# Patient Record
Sex: FEMALE | Race: WHITE
[De-identification: names, ages, dates, MRNs, and addresses within clinical notes are randomized per-mention and may not be internally consistent; named-entity substitution may affect disease eponyms.]

---

## 2018-04-27 ENCOUNTER — APPOINTMENT (OUTPATIENT)
Dept: INTERNAL MEDICINE | Facility: CLINIC | Age: 58
End: 2018-04-27
Payer: COMMERCIAL

## 2018-04-27 VITALS
HEART RATE: 74 BPM | BODY MASS INDEX: 17.58 KG/M2 | WEIGHT: 116 LBS | RESPIRATION RATE: 16 BRPM | HEIGHT: 68 IN | SYSTOLIC BLOOD PRESSURE: 124 MMHG | OXYGEN SATURATION: 98 % | TEMPERATURE: 98 F | DIASTOLIC BLOOD PRESSURE: 80 MMHG

## 2018-04-27 DIAGNOSIS — J30.2 OTHER SEASONAL ALLERGIC RHINITIS: ICD-10-CM

## 2018-04-27 DIAGNOSIS — C44.310 BASAL CELL CARCINOMA OF SKIN OF UNSPECIFIED PARTS OF FACE: ICD-10-CM

## 2018-04-27 DIAGNOSIS — N80.9 ENDOMETRIOSIS, UNSPECIFIED: ICD-10-CM

## 2018-04-27 DIAGNOSIS — R16.1 SPLENOMEGALY, NOT ELSEWHERE CLASSIFIED: ICD-10-CM

## 2018-04-27 DIAGNOSIS — K59.09 OTHER CONSTIPATION: ICD-10-CM

## 2018-04-27 DIAGNOSIS — F45.8 OTHER SOMATOFORM DISORDERS: ICD-10-CM

## 2018-04-27 DIAGNOSIS — I83.90 ASYMPTOMATIC VARICOSE VEINS OF UNSPECIFIED LOWER EXTREMITY: ICD-10-CM

## 2018-04-27 DIAGNOSIS — R91.1 SOLITARY PULMONARY NODULE: ICD-10-CM

## 2018-04-27 DIAGNOSIS — Z88.9 ALLERGY STATUS TO UNSPECIFIED DRUGS, MEDICAMENTS AND BIOLOGICAL SUBSTANCES: ICD-10-CM

## 2018-04-27 DIAGNOSIS — K63.5 POLYP OF COLON: ICD-10-CM

## 2018-04-27 DIAGNOSIS — Z20.1 CONTACT WITH AND (SUSPECTED) EXPOSURE TO TUBERCULOSIS: ICD-10-CM

## 2018-04-27 DIAGNOSIS — E04.1 NONTOXIC SINGLE THYROID NODULE: ICD-10-CM

## 2018-04-27 PROCEDURE — 94729 DIFFUSING CAPACITY: CPT

## 2018-04-27 PROCEDURE — 94060 EVALUATION OF WHEEZING: CPT

## 2018-04-27 PROCEDURE — 99215 OFFICE O/P EST HI 40 MIN: CPT | Mod: 25

## 2018-04-27 PROCEDURE — 94727 GAS DIL/WSHOT DETER LNG VOL: CPT

## 2018-04-27 RX ORDER — PROMETHAZINE HYDROCHLORIDE AND DEXTROMETHORPHAN HYDROBROMIDE ORAL SOLUTION 15; 6.25 MG/5ML; MG/5ML
6.25-15 SOLUTION ORAL
Qty: 118 | Refills: 0 | Status: COMPLETED | COMMUNITY
Start: 2018-01-23

## 2018-04-27 RX ORDER — HYDROCORTISONE VALERATE 2 MG/G
0.2 OINTMENT TOPICAL
Qty: 45 | Refills: 0 | Status: COMPLETED | COMMUNITY
Start: 2018-01-22

## 2018-04-27 RX ORDER — TRIAMCINOLONE ACETONIDE 1 MG/G
0.1 CREAM TOPICAL
Qty: 80 | Refills: 0 | Status: COMPLETED | COMMUNITY
Start: 2018-02-15

## 2018-04-27 RX ORDER — DOXYCYCLINE HYCLATE 100 MG/1
100 TABLET ORAL
Qty: 20 | Refills: 0 | Status: COMPLETED | COMMUNITY
Start: 2018-01-23

## 2018-04-27 RX ORDER — ASCORBIC ACID 500 MG
TABLET ORAL
Refills: 0 | Status: ACTIVE | COMMUNITY

## 2018-04-27 RX ORDER — OSELTAMIVIR PHOSPHATE 75 MG/1
75 CAPSULE ORAL
Qty: 10 | Refills: 0 | Status: COMPLETED | COMMUNITY
Start: 2018-01-26

## 2018-10-02 ENCOUNTER — APPOINTMENT (OUTPATIENT)
Dept: INTERNAL MEDICINE | Facility: CLINIC | Age: 58
End: 2018-10-02
Payer: COMMERCIAL

## 2018-10-02 VITALS
SYSTOLIC BLOOD PRESSURE: 104 MMHG | DIASTOLIC BLOOD PRESSURE: 62 MMHG | RESPIRATION RATE: 18 BRPM | HEART RATE: 67 BPM | HEIGHT: 68 IN | WEIGHT: 115 LBS | TEMPERATURE: 98.7 F | OXYGEN SATURATION: 99 % | BODY MASS INDEX: 17.43 KG/M2

## 2018-10-02 PROCEDURE — 99213 OFFICE O/P EST LOW 20 MIN: CPT

## 2018-10-02 PROCEDURE — 87880 STREP A ASSAY W/OPTIC: CPT | Mod: QW

## 2018-10-02 NOTE — PHYSICAL EXAM
[No Acute Distress] : no acute distress [Well Nourished] : well nourished [Well Developed] : well developed [Normal Outer Ear/Nose] : the outer ears and nose were normal in appearance [Normal TMs] : both tympanic membranes were normal [Normal Nasal Mucosa] : the nasal mucosa was normal [Supple] : supple [No Lymphadenopathy] : no lymphadenopathy [Clear to Auscultation] : lungs were clear to auscultation bilaterally [No Accessory Muscle Use] : no accessory muscle use [Normal Rate] : normal rate  [Regular Rhythm] : with a regular rhythm [Normal S1, S2] : normal S1 and S2 [Pedal Pulses Present] : the pedal pulses are present [No Extremity Clubbing/Cyanosis] : no extremity clubbing/cyanosis [Normal Posterior Cervical Nodes] : no posterior cervical lymphadenopathy [Normal Anterior Cervical Nodes] : no anterior cervical lymphadenopathy [Coordination Grossly Intact] : coordination grossly intact [No Focal Deficits] : no focal deficits [Normal Affect] : the affect was normal [Normal Insight/Judgement] : insight and judgment were intact [de-identified] : posterior oropharynx mildly erythematous , no exudate

## 2018-10-02 NOTE — REVIEW OF SYSTEMS
[Fatigue] : fatigue [Sore Throat] : sore throat [Negative] : Neurological [Fever] : no fever [Chills] : no chills [Vision Problems] : no vision problems [Earache] : no earache [Nasal Discharge] : no nasal discharge [Postnasal Drip] : no postnasal drip [FreeTextEntry4] : see HPI

## 2018-10-02 NOTE — HISTORY OF PRESENT ILLNESS
[FreeTextEntry8] : Pt presents to the office today with complaints of bilateral headache x 3 days , no complaints of nausea, vomiting, photophobia associated with it. This am she woke up with a sore throat. She works at [employer] and went [facility]. They sent a throat culture and was told  to  f/ up with her PCP. Consequently , she left work and came here . \par She states has been taking Advil for her headache with some relief. . She feels fatigued and tired, like she's "come down with something. " . She denies vision changes, fever, chills, eye tearing SOB, wheezing. . She went to a neurologist at [place] 2 years ago and had a workup. MRI , MRA and CT brain was negative per pt. At that time she had a headache for 7 days , eventually resolved with NSAID's. \par Of note, Her coworker was ill this past weekend with a sinus headache and virus. \par She sees Dr. Coats for history lung cancer surveillance. 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 She will be having a repeat CT  chest 11/18  \par

## 2018-10-02 NOTE — ASSESSMENT
[FreeTextEntry1] : pt  c/o pharyngitis, headache , consider viral syndrome\par Rapid strep negative, throat culture sent from [employer] [facility] this am pending \par Will treat symptomatically \par  NSAID's   OTC for headache , sore throat \par  salt water gargles - 1/2 teaspoon of salt per 1 cup of warm water PRN \par   Neuro f/up if headaches persist \par   Oncology  f/up \par F/up  10 /18 as scheduled \par If sx's persist or worsen pt will call , consider antibiotic \par  To ER with emergent symptoms \par   \par

## 2018-10-19 ENCOUNTER — NON-APPOINTMENT (OUTPATIENT)
Age: 58
End: 2018-10-19

## 2018-10-19 ENCOUNTER — APPOINTMENT (OUTPATIENT)
Dept: INTERNAL MEDICINE | Facility: CLINIC | Age: 58
End: 2018-10-19
Payer: COMMERCIAL

## 2018-10-19 VITALS
SYSTOLIC BLOOD PRESSURE: 110 MMHG | DIASTOLIC BLOOD PRESSURE: 66 MMHG | HEIGHT: 68 IN | OXYGEN SATURATION: 97 % | HEART RATE: 70 BPM | WEIGHT: 115 LBS | TEMPERATURE: 98.6 F | BODY MASS INDEX: 17.43 KG/M2 | RESPIRATION RATE: 16 BRPM

## 2018-10-19 DIAGNOSIS — K21.9 GASTRO-ESOPHAGEAL REFLUX DISEASE W/OUT ESOPHAGITIS: ICD-10-CM

## 2018-10-19 PROCEDURE — 94060 EVALUATION OF WHEEZING: CPT

## 2018-10-19 PROCEDURE — 99214 OFFICE O/P EST MOD 30 MIN: CPT | Mod: 25

## 2018-10-19 PROCEDURE — G0009: CPT

## 2018-10-19 PROCEDURE — 90732 PPSV23 VACC 2 YRS+ SUBQ/IM: CPT | Mod: GA

## 2018-10-19 PROCEDURE — 93000 ELECTROCARDIOGRAM COMPLETE: CPT

## 2018-10-19 NOTE — PHYSICAL EXAM
[No Acute Distress] : no acute distress [Well Nourished] : well nourished [Well Developed] : well developed [Well-Appearing] : well-appearing [Normal Sclera/Conjunctiva] : normal sclera/conjunctiva [PERRL] : pupils equal round and reactive to light [Normal Outer Ear/Nose] : the outer ears and nose were normal in appearance [Normal Oropharynx] : the oropharynx was normal [No JVD] : no jugular venous distention [Supple] : supple [No Lymphadenopathy] : no lymphadenopathy [No Respiratory Distress] : no respiratory distress  [Clear to Auscultation] : lungs were clear to auscultation bilaterally [No Accessory Muscle Use] : no accessory muscle use [Normal Rate] : normal rate  [Regular Rhythm] : with a regular rhythm [Normal S1, S2] : normal S1 and S2 [No Carotid Bruits] : no carotid bruits [No Edema] : there was no peripheral edema [No Extremity Clubbing/Cyanosis] : no extremity clubbing/cyanosis [Soft] : abdomen soft [Non Tender] : non-tender [Normal Posterior Cervical Nodes] : no posterior cervical lymphadenopathy [Normal Anterior Cervical Nodes] : no anterior cervical lymphadenopathy [No CVA Tenderness] : no CVA  tenderness [No Visual Abnormalities] : no visible abnormalities [Normal Lordosis] : normal lordosis [No Scoliosis] : no scoliosis [___] : [unfilled] [Intact] : all sensory within normal limits bilaterally [No Joint Swelling] : no joint swelling [Grossly Normal Strength/Tone] : grossly normal strength/tone [No Rash] : no rash [Normal Gait] : normal gait [Coordination Grossly Intact] : coordination grossly intact [No Focal Deficits] : no focal deficits [Speech Grossly Normal] : speech grossly normal [Memory Grossly Normal] : memory grossly normal [Normal Affect] : the affect was normal [Alert and Oriented x3] : oriented to person, place, and time [Normal Insight/Judgement] : insight and judgment were intact [de-identified] : thin and anxious [de-identified] : non icteric, left strabismus. [de-identified] : mild diffuse thyromegaly  [de-identified] : There is a well-healed scar on the left posterior chest wall region [de-identified] : 1/6 Kirt at B [de-identified] : There is a questionable small herniation in the midepigastric region below the xiphoid process [de-identified] : anxious

## 2018-10-19 NOTE — PLAN
[FreeTextEntry1] : 1. Continue his medication as outlined above.\par \par 2. Pneumovax 23 has been administered.\par \par 3. Repeat CAT scan of the chest as a surveillance study in November. This has been ordered by her medical oncologist.\par \par 4. The patient will contact her insurance company regarding coverage for the new shingles vaccine\par \par 5. The patient has been told to discontinue aspirin\par \par 6. Followup in 6 months with a wellness evaluation and routine fasting blood work.

## 2018-10-19 NOTE — HISTORY OF PRESENT ILLNESS
[de-identified] : The patient comes in today for a followup evaluation and reassessment. There is several issues to discuss.\par \par Lipoma standpoint, the patient states that she is doing fairly well. She has been exercising regularly without much dyspnea. She does not require the use of her metered dose inhaler before exercise. She continues to undergo surveillance CAT scans, due to the fact that she had a stage I non-small cell lung carcinoma removed from her left upper lung field. She is being followed by an oncologist who has been ordering scans. She has not followed up with a thoracic surgeon.\par \par The patient still does have intermittent reflux symptoms. She also complains of his mid epigastric swelling and deformity in the area. It has been evaluated by multiple specialists and note is able to give her an exact etiology of this abnormality.\par \par The patient had been on aspirin therapy. She was seen by a gastroenterologist at [place] and she was told that she may have had AVMs in her GI tract. This was probably causing intermittent bleeding and was probably the etiology of her low ferritin level. She denies seeing any dark stool. Her last endoscopic workup was performed in 2017.\par \par The patient sees a gynecologist regularly. There are no other constitutional symptoms. She exercises several days a week. She has already received a flu shot. She now comes in for this assessment.

## 2018-10-19 NOTE — DATA REVIEWED
[FreeTextEntry1] : Spirometric analysis is within normal limits. Bronchodilator activity is demonstrated.\par \par EKG shows sinus rhythm at a rate of 69. Normal intervals and axis. There is an RSR prime in V2 possibly consistent with an incomplete right bundle branch block. There are no acute changes seen.\par \par CAT scan of the chest performed on 7/3/18 is reviewed in report form. The right lower lobe nodule it apparently has increased minimally from 5-6 mm. There is a stable 2 mm right upper lobe nodule.

## 2018-12-28 ENCOUNTER — MEDICATION RENEWAL (OUTPATIENT)
Age: 58
End: 2018-12-28

## 2019-03-12 ENCOUNTER — APPOINTMENT (OUTPATIENT)
Dept: INTERNAL MEDICINE | Facility: CLINIC | Age: 59
End: 2019-03-12
Payer: COMMERCIAL

## 2019-03-12 VITALS
HEART RATE: 80 BPM | TEMPERATURE: 99.6 F | OXYGEN SATURATION: 96 % | BODY MASS INDEX: 17.88 KG/M2 | WEIGHT: 118 LBS | SYSTOLIC BLOOD PRESSURE: 118 MMHG | HEIGHT: 68 IN | RESPIRATION RATE: 16 BRPM | DIASTOLIC BLOOD PRESSURE: 70 MMHG

## 2019-03-12 DIAGNOSIS — K12.0 RECURRENT ORAL APHTHAE: ICD-10-CM

## 2019-03-12 LAB
CYTOLOGY CVX/VAG DOC THIN PREP: NORMAL
S PYO AG SPEC QL IA: NEGATIVE

## 2019-03-12 PROCEDURE — 87880 STREP A ASSAY W/OPTIC: CPT | Mod: QW

## 2019-03-12 PROCEDURE — 99214 OFFICE O/P EST MOD 30 MIN: CPT | Mod: 25

## 2019-03-12 RX ORDER — ASPIRIN 81 MG/1
TABLET ORAL DAILY
Refills: 0 | Status: DISCONTINUED | COMMUNITY
End: 2019-03-12

## 2019-03-13 NOTE — HISTORY OF PRESENT ILLNESS
[FreeTextEntry8] : Ms. EMMANUEL TAYLOR is a 58 year F with past medical history of lung CA, asthma, anxiety, depression, hyperlipidemia who comes in for an acute visit.\par Patient states for the last 10 days she's had a sore throat and some sinus pressure in her for head. About 10 days ago she had a very cold smooth he which caused instant pain in her mouth especially at the roof of her mouth. She notes that she has a sore on the roof of her mouth which has progressively gotten better in terms of pain. She went to employee health today is her sore throat was not improving and was told that she may have questionable ulcerations by the physician assistant in her throat, and she is history of lung CA that she should see her PCP. Patient denies any angina aphasia, dysphagia, night sweats or weight loss.\par Patient denies any cp, sob,abdominal pain, nausea, vomiting, palpitations, fever, chills, constipation, diarrhea.\par

## 2019-03-13 NOTE — ASSESSMENT
[FreeTextEntry1] : 1.pharyngitis: Rapid strep negative in office. Discussed supportive treatment with patient. Reassured there are no ulcerations in her pharynx.\par Salt water gargle 2-3 times daily. \par Increase fluids, rest. \par Take Tylenol 500 mg 1-2 tabs as needed every 8 hours for pain. \par Call for new or worsening symptoms.\par \par 2.aphthous ulceration on the anterior palate: Discussed treatment with viscous lidocaine for p.r.n. pain relief.

## 2019-03-14 ENCOUNTER — APPOINTMENT (OUTPATIENT)
Dept: INTERNAL MEDICINE | Facility: CLINIC | Age: 59
End: 2019-03-14

## 2019-03-27 ENCOUNTER — OUTPATIENT (OUTPATIENT)
Dept: OUTPATIENT SERVICES | Facility: HOSPITAL | Age: 59
LOS: 1 days | Discharge: HOME | End: 2019-03-27

## 2019-03-27 DIAGNOSIS — C34.90 MALIGNANT NEOPLASM OF UNSPECIFIED PART OF UNSPECIFIED BRONCHUS OR LUNG: ICD-10-CM

## 2019-04-04 ENCOUNTER — APPOINTMENT (OUTPATIENT)
Dept: HEMATOLOGY ONCOLOGY | Facility: CLINIC | Age: 59
End: 2019-04-04
Payer: COMMERCIAL

## 2019-04-04 VITALS
BODY MASS INDEX: 18.04 KG/M2 | HEART RATE: 89 BPM | WEIGHT: 119 LBS | TEMPERATURE: 98.8 F | OXYGEN SATURATION: 99 % | SYSTOLIC BLOOD PRESSURE: 124 MMHG | HEIGHT: 68 IN | DIASTOLIC BLOOD PRESSURE: 74 MMHG

## 2019-04-04 DIAGNOSIS — R51 HEADACHE: ICD-10-CM

## 2019-04-04 PROCEDURE — 99243 OFF/OP CNSLTJ NEW/EST LOW 30: CPT

## 2019-04-05 NOTE — ASSESSMENT
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 Presenting for second opinion regarding two small (yet growing) RLL nodules.\par \par Plan:\par -Repeat chest CT in June\par -Agree with plans for stereotactic radiosurgery\par -If adenocarcinoma confirmed, proceed with stereotactic radiosurgery. Discussed risks and benefits, and compared to VATS resection option.\par -Explained significance of EGFR mutation, and discussed possibility of using anti-EGFR medication in the future if advanced disease were to develop\par -Follow up after scan

## 2019-04-05 NOTE — REVIEW OF SYSTEMS
[Shortness Of Breath] : shortness of breath [Negative] : Allergic/Immunologic [FreeTextEntry6] : Morning SOB

## 2019-04-05 NOTE — HISTORY OF PRESENT ILLNESS
[de-identified] : Ms. Trinh is a 58 year old former smoking female presenting for an initial consultation for lung cancer. She has a history of Stage IA ROSALINDA adenocarcinoma, s/p left VATS and ROSALINDA lobectomy on 8/9/16 with Dr. Guanakito Goldman, with pathology showing no invasion, 0/9 lymph nodes, and negative margins. A mutation of the EGFR gene was detected, and she has a notable remote smoking history that includes half a pack per day for 6 years, quit almost 30 years ago. Upon follow up CTs, RLL nodules have been visualized with interval increase in size, but are too small to biopsy. Nicole now presents for a second opinion regarding these nodules. She has previously met with Dr. Coats and [facility] radiation onc[facility] with plans to undergo stereotactic radiosurgery once adenocarcinoma is confirmed.\par \par Chest CT on 3/4/19: Since 11/3/18, increased RLL nodule suspicious for adenocarcinoma. Increased groundglass nodule when compared to remote exams in the RLL possibly representing adenocarcinoma in situ. [de-identified] : She states that she is feeling well\par She notes that she was anticipating cyber-knife as per Dr. Coats\par She mentions she met with radiation oncologist at [facility], who told her a biopsy is not possible due to small size\par She states that she experiences left sided chest tightness in cold weather, and mentions she is s/p surgery and was told this tightness could be related to scar tissue\par She reports that she has not smoked in 30 years\par She complains of poor breathing some mornings

## 2019-04-05 NOTE — ADDENDUM
[FreeTextEntry1] : All medical record entries made by patricia Aguilar were at Dr. Mateus Keys's direction and personally dictated by me on (4/4/2019).

## 2019-04-24 ENCOUNTER — RESULT REVIEW (OUTPATIENT)
Age: 59
End: 2019-04-24

## 2019-05-31 ENCOUNTER — APPOINTMENT (OUTPATIENT)
Dept: INTERNAL MEDICINE | Facility: CLINIC | Age: 59
End: 2019-05-31
Payer: COMMERCIAL

## 2019-05-31 VITALS
HEART RATE: 64 BPM | OXYGEN SATURATION: 97 % | TEMPERATURE: 97.7 F | WEIGHT: 117 LBS | RESPIRATION RATE: 16 BRPM | HEIGHT: 68 IN | BODY MASS INDEX: 17.73 KG/M2 | DIASTOLIC BLOOD PRESSURE: 70 MMHG | SYSTOLIC BLOOD PRESSURE: 118 MMHG

## 2019-05-31 DIAGNOSIS — Z23 ENCOUNTER FOR IMMUNIZATION: ICD-10-CM

## 2019-05-31 PROCEDURE — 90715 TDAP VACCINE 7 YRS/> IM: CPT

## 2019-05-31 PROCEDURE — 90471 IMMUNIZATION ADMIN: CPT

## 2019-05-31 PROCEDURE — 99396 PREV VISIT EST AGE 40-64: CPT | Mod: 25

## 2019-05-31 RX ORDER — LIDOCAINE HYDROCHLORIDE 20 MG/ML
2 SOLUTION OROPHARYNGEAL
Qty: 1 | Refills: 0 | Status: DISCONTINUED | COMMUNITY
Start: 2019-03-12 | End: 2019-05-31

## 2019-05-31 NOTE — HISTORY OF PRESENT ILLNESS
[de-identified] : The patient comes in today for a wellness evaluation.\par \par The patient states, that at this time, she is fairly stable. She continues to be compliant with the medications. She denies any recent fevers chills or night sweats. She continues to exercise several days a week without difficulty.\par \par The patient was seen by her cardiologist, Dr. Devine. She has a very strong family history of coronary artery disease. The coronary artery disease has also occurred prematurely and her family. He would like her to undergo a calcium score with a CAT scan of the chest. She has not yet had it done.\par \par The patient has been seen by her gynecologist. She states that she is up-to-date in this regard.\par \par The patient continues to be followed by her medical oncologist, Dr. Alex. She has also been evaluated by a radiation oncologist at [facility], Dr. Covarrubias, regarding the abnormal CAT scan of the chest. It was his recommendation, to continue further observation and repeat another CAT scan in 3 months, which she just had done several days ago. He felt that the nodule was too small to biopsy 3 months ago, and now it appears as if the lesion has gotten even slightly smaller. The lesion is in the right lower lobe. She now comes in for this assessment.

## 2019-05-31 NOTE — PHYSICAL EXAM
[No Acute Distress] : no acute distress [Well Nourished] : well nourished [Well Developed] : well developed [Well-Appearing] : well-appearing [Normal Sclera/Conjunctiva] : normal sclera/conjunctiva [PERRL] : pupils equal round and reactive to light [Normal Outer Ear/Nose] : the outer ears and nose were normal in appearance [Normal Oropharynx] : the oropharynx was normal [No JVD] : no jugular venous distention [Supple] : supple [No Lymphadenopathy] : no lymphadenopathy [No Respiratory Distress] : no respiratory distress  [Clear to Auscultation] : lungs were clear to auscultation bilaterally [No Accessory Muscle Use] : no accessory muscle use [Normal Rate] : normal rate  [Regular Rhythm] : with a regular rhythm [Normal S1, S2] : normal S1 and S2 [No Carotid Bruits] : no carotid bruits [No Edema] : there was no peripheral edema [No Extremity Clubbing/Cyanosis] : no extremity clubbing/cyanosis [Soft] : abdomen soft [Non Tender] : non-tender [Normal Posterior Cervical Nodes] : no posterior cervical lymphadenopathy [Normal Anterior Cervical Nodes] : no anterior cervical lymphadenopathy [No CVA Tenderness] : no CVA  tenderness [No Visual Abnormalities] : no visible abnormalities [Normal Lordosis] : normal lordosis [No Scoliosis] : no scoliosis [___] : [unfilled] [Intact] : all sensory within normal limits bilaterally [No Joint Swelling] : no joint swelling [Grossly Normal Strength/Tone] : grossly normal strength/tone [No Rash] : no rash [Normal Gait] : normal gait [Coordination Grossly Intact] : coordination grossly intact [No Focal Deficits] : no focal deficits [Speech Grossly Normal] : speech grossly normal [Memory Grossly Normal] : memory grossly normal [Normal Affect] : the affect was normal [Alert and Oriented x3] : oriented to person, place, and time [Normal Insight/Judgement] : insight and judgment were intact [de-identified] : thin and anxious [de-identified] : non icteric, left strabismus. [de-identified] : mild diffuse thyromegaly  [de-identified] : 1/6 Kirt at B [de-identified] : anxious

## 2019-05-31 NOTE — PAST MEDICAL HISTORY
[Menarche Age ____] : age at menarche was [unfilled] [Definite ___ (Date)] : the last menstrual period was [unfilled]

## 2019-05-31 NOTE — HEALTH RISK ASSESSMENT
[Good] : ~his/her~  mood as  good [0] : 2) Feeling down, depressed, or hopeless: Not at all (0) [Patient reported mammogram was normal] : Patient reported mammogram was normal [Patient reported bone density results were normal] : Patient reported bone density results were normal [Patient reported colonoscopy was normal] : Patient reported colonoscopy was normal [Smoke Detector] : smoke detector [Carbon Monoxide Detector] : carbon monoxide detector [Seat Belt] :  uses seat belt [Sunscreen] : uses sunscreen [] : No [de-identified] : occasional  [Guns at Home] : no guns at home [MammogramDate] : 01/2018 [BoneDensityDate] : 01/2016 [ColonoscopyDate] : 01/2017

## 2019-09-17 ENCOUNTER — MEDICATION RENEWAL (OUTPATIENT)
Age: 59
End: 2019-09-17

## 2020-01-10 ENCOUNTER — APPOINTMENT (OUTPATIENT)
Dept: INTERNAL MEDICINE | Facility: CLINIC | Age: 60
End: 2020-01-10
Payer: COMMERCIAL

## 2020-01-10 VITALS
HEIGHT: 68 IN | WEIGHT: 118 LBS | HEART RATE: 81 BPM | DIASTOLIC BLOOD PRESSURE: 65 MMHG | OXYGEN SATURATION: 99 % | SYSTOLIC BLOOD PRESSURE: 110 MMHG | RESPIRATION RATE: 16 BRPM | TEMPERATURE: 98.2 F | BODY MASS INDEX: 17.88 KG/M2

## 2020-01-10 PROCEDURE — 94729 DIFFUSING CAPACITY: CPT

## 2020-01-10 PROCEDURE — 90471 IMMUNIZATION ADMIN: CPT

## 2020-01-10 PROCEDURE — 99214 OFFICE O/P EST MOD 30 MIN: CPT | Mod: 25

## 2020-01-10 PROCEDURE — 90750 HZV VACC RECOMBINANT IM: CPT

## 2020-01-10 PROCEDURE — ZZZZZ: CPT

## 2020-01-10 PROCEDURE — 94060 EVALUATION OF WHEEZING: CPT

## 2020-01-10 PROCEDURE — 94727 GAS DIL/WSHOT DETER LNG VOL: CPT

## 2020-01-10 RX ORDER — RANITIDINE HYDROCHLORIDE 150 MG/1
150 CAPSULE ORAL TWICE DAILY
Qty: 180 | Refills: 1 | Status: DISCONTINUED | COMMUNITY
Start: 2017-09-25 | End: 2020-01-10

## 2020-01-22 NOTE — PLAN
[FreeTextEntry1] : 1. Continue cardiovascular exercise regimen.\par \par 2. The first dose of the shingrix vaccine was administered today.\par \par 3. Follow up with Dr. Devine for cardiac evaluation.\par \par 4. Follow up with myself in 6 months for a wellness evaluation and RFBW. The second dose of the shingrix vaccine will also be administered at this visit.\par \par 5. Follow up CT scan of chest in May. It will be reviewed by her cardiothoracic surgeon, Dr. David Fiore, at [facility].\par \par 6. Repeat thyroid sonogram to re-evaluate subcentimeter thyroid nodules. C/W 2018 study

## 2020-01-22 NOTE — PHYSICAL EXAM
[No Acute Distress] : no acute distress [Well Developed] : well developed [Well Nourished] : well nourished [Normal Sclera/Conjunctiva] : normal sclera/conjunctiva [Well-Appearing] : well-appearing [Normal Outer Ear/Nose] : the outer ears and nose were normal in appearance [PERRL] : pupils equal round and reactive to light [Normal Oropharynx] : the oropharynx was normal [No JVD] : no jugular venous distention [Supple] : supple [No Lymphadenopathy] : no lymphadenopathy [No Respiratory Distress] : no respiratory distress  [Clear to Auscultation] : lungs were clear to auscultation bilaterally [No Accessory Muscle Use] : no accessory muscle use [Normal Rate] : normal rate  [Regular Rhythm] : with a regular rhythm [Normal S1, S2] : normal S1 and S2 [No Carotid Bruits] : no carotid bruits [No Edema] : there was no peripheral edema [No Extremity Clubbing/Cyanosis] : no extremity clubbing/cyanosis [Soft] : abdomen soft [Non Tender] : non-tender [Normal Posterior Cervical Nodes] : no posterior cervical lymphadenopathy [Normal Anterior Cervical Nodes] : no anterior cervical lymphadenopathy [No CVA Tenderness] : no CVA  tenderness [No Visual Abnormalities] : no visible abnormalities [Normal Lordosis] : normal lordosis [No Scoliosis] : no scoliosis [___] : [unfilled] [No Joint Swelling] : no joint swelling [Intact] : all sensory within normal limits bilaterally [No Rash] : no rash [Grossly Normal Strength/Tone] : grossly normal strength/tone [Coordination Grossly Intact] : coordination grossly intact [Normal Gait] : normal gait [No Focal Deficits] : no focal deficits [Memory Grossly Normal] : memory grossly normal [Speech Grossly Normal] : speech grossly normal [Normal Affect] : the affect was normal [Alert and Oriented x3] : oriented to person, place, and time [Normal Insight/Judgement] : insight and judgment were intact [de-identified] : thin and anxious [de-identified] : non icteric, left strabismus. [de-identified] : mild diffuse thyromegaly  [de-identified] : There is a well-healed scar on the left posterior chest wall region [de-identified] : 1/6 Kirt at B [de-identified] : There is a questionable small herniation in the midepigastric region below the xiphoid process [de-identified] : anxious

## 2020-01-22 NOTE — ADDENDUM
[FreeTextEntry1] : I, Basim Curry, acted solely as a scribe for Dr. Kenrick Balbuena on this date 01/10/2020.

## 2020-01-22 NOTE — DATA REVIEWED
[FreeTextEntry1] : A pulmonary function test is performed. The lung volumes are within normal limits. Lung mechanics reveal a mild decrease in flow rates with slight bronchodilator reactivity. The DLCO and saturation are maintained.This demonstrates a mild degree of obstruction with slight airway reactivity.

## 2020-01-22 NOTE — END OF VISIT
[FreeTextEntry3] : All medical record entries made by the scribe were at my, Dr. Kenrick Balbuena, direction and personally dictated by me on 01/10/2020. I have reviewed the chart and agree that the record accurately reflects my personal performance of the history, physical exam, assessment and plan. I have also personally directed, reviewed, and agreed with the chart.

## 2020-05-04 NOTE — HISTORY OF PRESENT ILLNESS
[address], room 915. [phone].    [de-identified] : This patient comes in today for a follow up evaluation. There are several issues to discuss.\par \par Overall, the patient states that she is relatively stable. She has a history of lung cancer. Her most recent CT scan of her chest was performed in November 2019. The results indicated a ground glass nodule in her right lower lobe that has increased in size from 3 mm to 7 mm. She has been advised by Dr. Fiore at [facility] to wait until May for a follow up CT scan for re-evaluation before considering radiation therapy or lung resection. \par \par Currently, she has been experiencing rhinitis and sputum production. She denies coughing, wheezing, and SOB. She has used a sinus rinse with improvement in her symptoms.\par \par The patient continues to exercise on a regular basis. However, she will be reducing her regimen to gain weight. She denies CP, tightness, palpitations, and dyspnea on exertion. She is followed by Dr. Devine due to her family history of CAD. He wanted her to undergo a calcium score last year. However, she has not done so because she has completed many other tests recently.\par \par Additionally, she has been experiencing intermittent pain on the right side of her head for the past week. She has not taken any medication for pain management.\par \par She denies fevers, chills, night sweats and any other constitutional symptoms. She now comes in for this assessment.

## 2020-05-18 ENCOUNTER — APPOINTMENT (OUTPATIENT)
Dept: INTERNAL MEDICINE | Facility: CLINIC | Age: 60
End: 2020-05-18
Payer: COMMERCIAL

## 2020-05-18 PROCEDURE — 90750 HZV VACC RECOMBINANT IM: CPT

## 2020-05-18 PROCEDURE — 90471 IMMUNIZATION ADMIN: CPT

## 2020-08-07 ENCOUNTER — APPOINTMENT (OUTPATIENT)
Dept: INTERNAL MEDICINE | Facility: CLINIC | Age: 60
End: 2020-08-07
Payer: COMMERCIAL

## 2020-08-07 VITALS
OXYGEN SATURATION: 96 % | HEIGHT: 68 IN | HEART RATE: 69 BPM | WEIGHT: 118 LBS | SYSTOLIC BLOOD PRESSURE: 112 MMHG | RESPIRATION RATE: 18 BRPM | BODY MASS INDEX: 17.88 KG/M2 | TEMPERATURE: 98.8 F | DIASTOLIC BLOOD PRESSURE: 70 MMHG

## 2020-08-07 PROCEDURE — 99396 PREV VISIT EST AGE 40-64: CPT

## 2020-08-07 NOTE — PLAN
[FreeTextEntry1] : 1.  At this time, will continue to observe without medications.\par \par 2. The patient has been referred for endocrinology evaluation with Dr. Dc  regarding institution of therapy and the options that are available, for treatment of osteoporosis.\par \par 3.  Routine GYN followup in October.\par \par 4. Routine cardiology followup in the fall\par \par 5. Flu shot in the fall\par \par 6. Followup with myself in 6 months with full pulmonary function testing.\par \par 7.  Awaiting consultation from her second opinion medical oncologist at [facility].

## 2020-08-07 NOTE — PHYSICAL EXAM
[Well Nourished] : well nourished [Well Developed] : well developed [No Acute Distress] : no acute distress [Well-Appearing] : well-appearing [Normal Sclera/Conjunctiva] : normal sclera/conjunctiva [PERRL] : pupils equal round and reactive to light [Normal Oropharynx] : the oropharynx was normal [Normal Outer Ear/Nose] : the outer ears and nose were normal in appearance [No Lymphadenopathy] : no lymphadenopathy [Supple] : supple [No JVD] : no jugular venous distention [No Respiratory Distress] : no respiratory distress  [Clear to Auscultation] : lungs were clear to auscultation bilaterally [Regular Rhythm] : with a regular rhythm [No Accessory Muscle Use] : no accessory muscle use [Normal Rate] : normal rate  [Normal S1, S2] : normal S1 and S2 [No Edema] : there was no peripheral edema [No Carotid Bruits] : no carotid bruits [Non Tender] : non-tender [Soft] : abdomen soft [No Extremity Clubbing/Cyanosis] : no extremity clubbing/cyanosis [No CVA Tenderness] : no CVA  tenderness [Normal Anterior Cervical Nodes] : no anterior cervical lymphadenopathy [Normal Posterior Cervical Nodes] : no posterior cervical lymphadenopathy [Normal Lordosis] : normal lordosis [No Scoliosis] : no scoliosis [No Visual Abnormalities] : no visible abnormalities [___] : [unfilled] [No Joint Swelling] : no joint swelling [Intact] : all sensory within normal limits bilaterally [No Rash] : no rash [Normal Gait] : normal gait [Coordination Grossly Intact] : coordination grossly intact [Grossly Normal Strength/Tone] : grossly normal strength/tone [No Focal Deficits] : no focal deficits [Speech Grossly Normal] : speech grossly normal [Memory Grossly Normal] : memory grossly normal [Alert and Oriented x3] : oriented to person, place, and time [Normal Affect] : the affect was normal [Normal Insight/Judgement] : insight and judgment were intact [de-identified] : thin and anxious [de-identified] : 1/6 Kirt at B [de-identified] : non icteric, left strabismus. [de-identified] : mild diffuse thyromegaly  [de-identified] : anxious

## 2020-08-07 NOTE — HEALTH RISK ASSESSMENT
[] : Yes [Yes] : Yes [No] : In the past 12 months have you used drugs other than those required for medical reasons? 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

## 2020-08-07 NOTE — HISTORY OF PRESENT ILLNESS
[de-identified] : the patient comes in today for a wellness evaluation.\par \par At this time, the patient states she is feeling well.  She did undergo a followup surveillance CAT scan of the chest in May. They were no changes noted in the nodules that we have been following in the right lung zone. She spoke with her thoracic surgeon at [facility] who felt that we did take just continue to observe and follow with serial CAT scans. Her next CAT scan will be performed in November of this year. The patient, however, is going for a second opinion at [facility] with a Dr. Nicole. The appointment is for 8/11/20.she denies any cough, sputum production, fevers, chills, night sweats, or swollen glands.the patient does note, there her medical oncologist locally, was considering a medication to treat her  without having a definitive tissue diagnosis.\par \par The patient did undergo a followup bone density. She is noted to have osteoporosis. She has not been exercising recently due to the corona virus epidemic. There has been no change in bowel habits. She was seen by her cardiologist. She denies any other constitutional symptoms at this time. She now comes in for this assessment.\par \par

## 2020-08-21 ENCOUNTER — APPOINTMENT (OUTPATIENT)
Dept: ENDOCRINOLOGY | Facility: CLINIC | Age: 60
End: 2020-08-21
Payer: COMMERCIAL

## 2020-08-21 VITALS
DIASTOLIC BLOOD PRESSURE: 68 MMHG | HEIGHT: 68 IN | BODY MASS INDEX: 17.28 KG/M2 | OXYGEN SATURATION: 97 % | WEIGHT: 114 LBS | HEART RATE: 95 BPM | SYSTOLIC BLOOD PRESSURE: 110 MMHG

## 2020-08-21 PROCEDURE — 99205 OFFICE O/P NEW HI 60 MIN: CPT

## 2020-08-21 NOTE — REVIEW OF SYSTEMS
[Decreased Appetite] : appetite not decreased [Fatigue] : no fatigue [Recent Weight Loss (___ Lbs)] : no recent weight loss [Recent Weight Gain (___ Lbs)] : no recent weight gain [Visual Field Defect] : no visual field defect [Dry Eyes] : no dryness [Eye Pain] : no pain [Dysphagia] : no dysphagia [Neck Pain] : no neck pain [Chest Pain] : no chest pain [Palpitations] : no palpitations [Lower Ext Edema] : no lower extremity edema [Constipation] : no constipation [Nausea] : no nausea [Abdominal Pain] : no abdominal pain [Vomiting] : no vomiting [Diarrhea] : no diarrhea [Acanthosis] : no acanthosis  [Acne] : no acne [Dry Skin] : no dry skin [Headaches] : no headaches [Dizziness] : no dizziness [Tremors] : no tremors [Depression] : no depression [Insomnia] : no insomnia [Polydipsia] : no polydipsia [Anxiety] : no anxiety [Cold Intolerance] : no cold intolerance [Heat Intolerance] : no heat intolerance [Easy Bleeding] : no ~M tendency for easy bleeding [Easy Bruising] : no tendency for easy bruising [Swelling] : no swelling

## 2020-08-21 NOTE — HISTORY OF PRESENT ILLNESS
[FreeTextEntry1] : Ms. Andrez Liu is 59 yr old female, here today for initial evaluation. \par \par Osteoporosis: \par Diagnosed  by a bone density scan in 5/ 2014\par Patient denies  history of fracture. Is  currently being treated with calcium and vitamin D supplementation. Estimated dietary calcium is moderate.\par Patient  DOES NOT reports height loss of > 2 inches. Patient has not  fallen > 2 times in the past year. \par Patient participates in routine weight bearing exercises, but lately not due to COVID.\par \par Past antiresorptive treatment:\par none\par \par Osteoporosis Risk Factors \par Nonmodifiable\par Personal Hx of fracture as an adult: no\par Hx of fracture in first-degree relative: no\par  race:yes\par Advanced age: no\par Female sex: yes\par Dementia: yes\par Poor health/frailty: yes\par Chronic Glucocorticoid Use  :no\par Primary hyperparathyroidism  :no\par Chronic Seizure Medications  :no\par Breast Cancer with aromatase inhibitor use  :no\par Has h/o lung cancer, left lung resection in 2016. No h/o radiation.\par History of Kidney stone :no\par \par Potentially modifiable:\par Tobacco use:no\par Low body weight (<127 lbs): yes\par Estrogen deficiency\par    early menopause (age <45) or bilateral ovariectomy: no\par    prolonged premenopausal amenorrhea(>1 yr): no\par    DEPOProvera use :no\par Low calcium intake (lifelong): no\par Alcoholism: no\par Recurrent falls: no\par Inadequate physical activity: no\par Vitamin D Deficiency: no\par \par Current calcium and Vit D intake:\par Dietary sources: almond milk daily, yogurt.Protein drinks daily.\par Supplements: 1000 units of vitamin D daily, takes MVI daily.Plant based calcium pills.\par \par Other Issues: has had a lot of  dental work up done in past, still has issues,has bridge on lower teeth,  might need implant.\par \par

## 2020-08-21 NOTE — ASSESSMENT
[FreeTextEntry1] : Ms. Andrez Liu is 59 yr old female, here today for initial evaluation. \par \par Osteoporosis: \par Diagnosed  by a bone density scan in 5/ 2014. Recent DXA scan in  7/2020 showed deterioration in BMD, T score at Spine is -3.5 and femoral neck is -2.7.\par She has never been on any medications, she is very worried about side effects, does not want  to do infusion or injections, says she has had friends with bad side effects to prolia.So wants to avoid it.\par  Osteoporosis \par 1- Discussed medication options/ mechanisms of action. Risks/ benefits/ side effects. \par Discussed risk of ONJ with bisphosphonates and prolia  in detail.\par \par 2- Labs to rule out secondary causes\par as below\par \par 3. Dental evaluation \par Dental evaluation discussed in detail, oral exam done in clinic today.\par She will get dental clearance form her dentist.\par \par 4. The risks and benefits of my recommendations, as well as other treatment options were discussed with patient  today. Questions were answered.\par \par 5. Lifestyle changes:\par Continue Calcium and vitamin D supplementation.\par Smoking cessation\par Alcohol max  use 1 drink day women/ 2 drinks day men\par Daily weight bearing and postural exercises\par Dietary calcium\par \par Labs:\par calcium\par Phos\par PTH\par Mag\par 24 hr urine calcium\par Urine NTX\par Vitamin D \par Renal functions\par \par \par At this point she wants to try fosamax and see how it helps.\par \par Follow up:in 1 year\par \par \par

## 2020-08-21 NOTE — PHYSICAL EXAM
[Alert] : alert [Well Nourished] : well nourished [No Acute Distress] : no acute distress [Normal Sclera/Conjunctiva] : normal sclera/conjunctiva [No Proptosis] : no proptosis [No Lid Lag] : no lid lag [No Neck Mass] : no neck mass was observed [Supple] : the neck was supple [Thyroid Not Enlarged] : the thyroid was not enlarged [No Thyroid Nodules] : no palpable thyroid nodules [No Respiratory Distress] : no respiratory distress [No Accessory Muscle Use] : no accessory muscle use [Clear to Auscultation] : lungs were clear to auscultation bilaterally [Normal Rate and Effort] : normal respiratory rate and effort [Normal S1, S2] : normal S1 and S2 [No Murmurs] : no murmurs [Normal Rate] : heart rate was normal [Regular Rhythm] : with a regular rhythm [Normal Bowel Sounds] : normal bowel sounds [Not Tender] : non-tender [Not Distended] : not distended [No Masses] : no abdominal mass palpated [Soft] : abdomen soft [Normal Supraclavicular Nodes] : no supraclavicular lymphadenopathy [Normal Anterior Cervical Nodes] : no anterior cervical lymphadenopathy [Normal Posterior Cervical Nodes] : no posterior cervical lymphadenopathy [No Stigmata of Cushings Syndrome] : no stigmata of Cushings Syndrome [Normal Gait] : normal gait [No Clubbing, Cyanosis] : no clubbing  or cyanosis of the fingernails [No Joint Swelling] : no joint swelling seen [No Rash] : no rash [No Skin Lesions] : no skin lesions [No Motor Deficits] : the motor exam was normal [No Sensory Deficits] : the sensory exam was normal to light touch and pinprick [No Tremors] : no tremors [Oriented x3] : oriented to person, place, and time [Normal Affect] : the affect was normal [Normal Insight/Judgement] : insight and judgment were intact [Acanthosis Nigricans] : no acanthosis nigricans

## 2021-02-12 DIAGNOSIS — Z20.828 CONTACT WITH AND (SUSPECTED) EXPOSURE TO OTHER VIRAL COMMUNICABLE DISEASES: ICD-10-CM

## 2021-02-14 ENCOUNTER — APPOINTMENT (OUTPATIENT)
Dept: DISASTER EMERGENCY | Facility: CLINIC | Age: 61
End: 2021-02-14

## 2021-02-15 LAB — SARS-COV-2 N GENE NPH QL NAA+PROBE: NOT DETECTED

## 2021-02-19 ENCOUNTER — APPOINTMENT (OUTPATIENT)
Dept: INTERNAL MEDICINE | Facility: CLINIC | Age: 61
End: 2021-02-19
Payer: COMMERCIAL

## 2021-02-19 ENCOUNTER — NON-APPOINTMENT (OUTPATIENT)
Age: 61
End: 2021-02-19

## 2021-02-19 ENCOUNTER — APPOINTMENT (OUTPATIENT)
Dept: INTERNAL MEDICINE | Facility: CLINIC | Age: 61
End: 2021-02-19

## 2021-02-19 VITALS
SYSTOLIC BLOOD PRESSURE: 106 MMHG | TEMPERATURE: 96.9 F | DIASTOLIC BLOOD PRESSURE: 70 MMHG | BODY MASS INDEX: 18.52 KG/M2 | OXYGEN SATURATION: 97 % | HEART RATE: 66 BPM | WEIGHT: 118 LBS | HEIGHT: 67 IN | RESPIRATION RATE: 16 BRPM

## 2021-02-19 PROCEDURE — 99214 OFFICE O/P EST MOD 30 MIN: CPT

## 2021-02-19 PROCEDURE — 99072 ADDL SUPL MATRL&STAF TM PHE: CPT

## 2021-02-19 RX ORDER — HYDROCHLOROTHIAZIDE 12.5 MG/1
12.5 TABLET ORAL DAILY
Qty: 180 | Refills: 1 | Status: DISCONTINUED | COMMUNITY
Start: 2020-09-10 | End: 2021-02-19

## 2021-02-19 RX ORDER — ALENDRONATE SODIUM 70 MG/1
70 TABLET ORAL
Qty: 12 | Refills: 3 | Status: DISCONTINUED | COMMUNITY
Start: 2020-08-21 | End: 2021-02-19

## 2021-02-19 NOTE — HISTORY OF PRESENT ILLNESS
[FreeTextEntry1] : The patient comes in today for a routine followup evaluation.\par  [de-identified] : The patient states that she is doing relatively well overall. She did undergo a followup thoracic surgical consultation with Dr. Fiore in [place], in November. She did undergo her last CAT scan of the chest in October. He continues to make the same recommendation regarding the followup of these subcentimeter pulmonary nodules. He does have some concerns with the nodule that measures 7 mm. To this end, he would like to have her undergo a repeat CAT scan in 6 months which would be the end of April or early May. She is asymptomatic with regards to weight loss, night sweats, or hemoptysis.\par \par The patient did undergo to endocrinology consultations regarding her osteoporosis. She was initially seen by Dr. 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 She had significant reflux for several days after taking one dose of Fosamax. She then saw Dr. Matos, and he discontinue the hydrochlorothiazide. He did a FRAX analysis on her. At the present time she is not on any medications.\par \par The patient was also seen by her cardiologist. She was felt to be stable. She denies any constitutional symptoms at present.

## 2021-02-19 NOTE — PLAN
[FreeTextEntry1] : 1. At this time, will continue to observe without prescribed medications.\par \par 2. The patient will return in approximately one month for physiologic pulmonary function studies.\par \par 3. With regards to her lung nodule, she will undergo a repeat CAT scan of the chest in early May. This will again be reviewed with her thoracic surgical team in [place].\par \par 4. The patient will continue to followup with endocrinology, Dr. Matos, regarding her osteoporosis.\par \par 5. Resumption of cardiovascular exercise regimen has been recommended\par \par 6. Followup in 6 months with a wellness evaluation and yearly routine fasting blood work.

## 2021-02-19 NOTE — PHYSICAL EXAM
[No Acute Distress] : no acute distress [Well Nourished] : well nourished [Well Developed] : well developed [Well-Appearing] : well-appearing [Normal Outer Ear/Nose] : the outer ears and nose were normal in appearance [Normal Oropharynx] : the oropharynx was normal [No JVD] : no jugular venous distention [No Lymphadenopathy] : no lymphadenopathy [No Respiratory Distress] : no respiratory distress  [Clear to Auscultation] : lungs were clear to auscultation bilaterally [No Accessory Muscle Use] : no accessory muscle use [Normal Rate] : normal rate  [Regular Rhythm] : with a regular rhythm [Normal S1, S2] : normal S1 and S2 [No Edema] : there was no peripheral edema [No Extremity Clubbing/Cyanosis] : no extremity clubbing/cyanosis [Soft] : abdomen soft [Normal Bowel Sounds] : normal bowel sounds [Normal Posterior Cervical Nodes] : no posterior cervical lymphadenopathy [Normal Anterior Cervical Nodes] : no anterior cervical lymphadenopathy [No CVA Tenderness] : no CVA  tenderness [No Visual Abnormalities] : no visible abnormalities [Normal Lordosis] : normal lordosis [No Scoliosis] : no scoliosis [___] : [unfilled] [Intact] : all sensory within normal limits bilaterally [No Rash] : no rash [Speech Grossly Normal] : speech grossly normal [Memory Grossly Normal] : memory grossly normal [Normal Affect] : the affect was normal [Alert and Oriented x3] : oriented to person, place, and time [Normal Insight/Judgement] : insight and judgment were intact [de-identified] : thin and anxious [de-identified] : mild diffuse thyromegaly  [de-identified] : There is a well-healed scar on the left posterior chest wall region [de-identified] : 1/6 Kirt at B [de-identified] : There is a questionable small herniation in the midepigastric region below the xiphoid process [de-identified] : anxious

## 2021-03-22 LAB — SARS-COV-2 N GENE NPH QL NAA+PROBE: NOT DETECTED

## 2021-03-26 ENCOUNTER — APPOINTMENT (OUTPATIENT)
Dept: INTERNAL MEDICINE | Facility: CLINIC | Age: 61
End: 2021-03-26
Payer: COMMERCIAL

## 2021-03-26 VITALS
DIASTOLIC BLOOD PRESSURE: 72 MMHG | WEIGHT: 119 LBS | SYSTOLIC BLOOD PRESSURE: 122 MMHG | TEMPERATURE: 98.5 F | OXYGEN SATURATION: 97 % | HEART RATE: 96 BPM | RESPIRATION RATE: 16 BRPM | HEIGHT: 67.5 IN | BODY MASS INDEX: 18.46 KG/M2

## 2021-03-26 PROCEDURE — 94729 DIFFUSING CAPACITY: CPT

## 2021-03-26 PROCEDURE — 94727 GAS DIL/WSHOT DETER LNG VOL: CPT

## 2021-03-26 PROCEDURE — 99072 ADDL SUPL MATRL&STAF TM PHE: CPT

## 2021-03-26 PROCEDURE — 94010 BREATHING CAPACITY TEST: CPT

## 2021-03-29 ENCOUNTER — NON-APPOINTMENT (OUTPATIENT)
Age: 61
End: 2021-03-29

## 2021-04-27 ENCOUNTER — NON-APPOINTMENT (OUTPATIENT)
Age: 61
End: 2021-04-27

## 2021-05-13 DIAGNOSIS — F41.9 ANXIETY DISORDER, UNSPECIFIED: ICD-10-CM

## 2021-05-28 ENCOUNTER — APPOINTMENT (OUTPATIENT)
Dept: INTERNAL MEDICINE | Facility: CLINIC | Age: 61
End: 2021-05-28
Payer: COMMERCIAL

## 2021-05-28 VITALS
SYSTOLIC BLOOD PRESSURE: 102 MMHG | HEIGHT: 67.5 IN | OXYGEN SATURATION: 98 % | WEIGHT: 121 LBS | BODY MASS INDEX: 18.77 KG/M2 | HEART RATE: 68 BPM | TEMPERATURE: 97.4 F | RESPIRATION RATE: 16 BRPM | DIASTOLIC BLOOD PRESSURE: 58 MMHG

## 2021-05-28 DIAGNOSIS — M54.6 PAIN IN THORACIC SPINE: ICD-10-CM

## 2021-05-28 DIAGNOSIS — Z14.8 GENETIC CARRIER OF OTHER DISEASE: ICD-10-CM

## 2021-05-28 PROCEDURE — 99072 ADDL SUPL MATRL&STAF TM PHE: CPT

## 2021-05-28 PROCEDURE — 99214 OFFICE O/P EST MOD 30 MIN: CPT

## 2021-05-28 NOTE — HISTORY OF PRESENT ILLNESS
[FreeTextEntry1] : Back pain [de-identified] : Ms. Trinh presents for an evaluation complaining of mid scapular back pain. She has had this back pain for several weeks. It waxes and wanes in intensity. She has no associated shortness of breath. Ms. Trinh is scheduled for resection of a right lower lobe lung nodule on 6/10/21 and [facility] Back showedpositive uptake on CT/PET scan. She has a previous history of left upper lobe resection for a stage IA non-small cell lung carcinoma. Patient denies any hemoptysis, anorexia or weight loss.

## 2021-05-28 NOTE — PLAN
[FreeTextEntry1] : Ms. Trinh presents for evaluation complaining of mid scapular back pain. The pain appears to be constant however the intensity waxes and wanes. She will continue use Tylenol on a p.r.n. basis. Ms. Trinh is scheduled to undergo resection of a right lower lobe 8 mm spiculated density that is positive on CT/PET scan. She will be going to [facility] for resection. Patient will followup with  as scheduled

## 2021-06-28 ENCOUNTER — NON-APPOINTMENT (OUTPATIENT)
Age: 61
End: 2021-06-28

## 2021-07-06 ENCOUNTER — APPOINTMENT (OUTPATIENT)
Dept: INTERNAL MEDICINE | Facility: CLINIC | Age: 61
End: 2021-07-06
Payer: COMMERCIAL

## 2021-07-06 VITALS
OXYGEN SATURATION: 97 % | SYSTOLIC BLOOD PRESSURE: 108 MMHG | HEART RATE: 94 BPM | WEIGHT: 109 LBS | DIASTOLIC BLOOD PRESSURE: 76 MMHG | TEMPERATURE: 98.2 F | RESPIRATION RATE: 16 BRPM | BODY MASS INDEX: 17.11 KG/M2 | HEIGHT: 67 IN

## 2021-07-06 DIAGNOSIS — Z20.822 ENCOUNTER FOR PREPROCEDURAL LABORATORY EXAMINATION: ICD-10-CM

## 2021-07-06 DIAGNOSIS — Z01.812 ENCOUNTER FOR PREPROCEDURAL LABORATORY EXAMINATION: ICD-10-CM

## 2021-07-06 DIAGNOSIS — J98.01 ACUTE BRONCHOSPASM: ICD-10-CM

## 2021-07-06 DIAGNOSIS — Z00.00 ENCOUNTER FOR GENERAL ADULT MEDICAL EXAMINATION W/OUT ABNORMAL FINDINGS: ICD-10-CM

## 2021-07-06 DIAGNOSIS — Z87.898 PERSONAL HISTORY OF OTHER SPECIFIED CONDITIONS: ICD-10-CM

## 2021-07-06 DIAGNOSIS — Z01.818 ENCOUNTER FOR OTHER PREPROCEDURAL EXAMINATION: ICD-10-CM

## 2021-07-06 DIAGNOSIS — C34.90 MALIGNANT NEOPLASM OF UNSPECIFIED PART OF UNSPECIFIED BRONCHUS OR LUNG: ICD-10-CM

## 2021-07-06 PROCEDURE — 99214 OFFICE O/P EST MOD 30 MIN: CPT

## 2021-07-06 PROCEDURE — 99072 ADDL SUPL MATRL&STAF TM PHE: CPT

## 2021-07-06 NOTE — HISTORY OF PRESENT ILLNESS
[FreeTextEntry1] : f/up  [de-identified] : Pt here as a followup from [facility] 6/29/21. She is a 60 yr old female   s/p VATS RLL wedge resection on 6/1021 by Dr. David Fiore  at [facility] for adenocarcinoma stage 1 .  She developed a dry cough immediately following surgery,  which became persistent  . Denies fever, chills, purulent sputum production, She went to [facility] on 6/29/21 , CXRY revealed no infiltrate, pleural effusion,  or consolidation .  [facility] was in touch with Dr. Fiore ,surgeon, CT considered no t necessary. She is schedule for F/up CT in December. Per pt, she was prescribed Tessalon Perles 200 mg po TId PRN , which resolved the cough completely. She also had a telehealth appt with Dr. Fiore  last week. \par Pt states was going to cancel today's appt but decided to keep it for F/up. .\par  She denies shortness of  breath, wheeze , chest pain, lightheadedness.   She has been walking to Build up her physical conditioning.

## 2021-07-06 NOTE — REVIEW OF SYSTEMS
[Fever] : no fever [Chills] : no chills [Fatigue] : no fatigue [Shortness Of Breath] : no shortness of breath [Wheezing] : no wheezing [Cough] : cough [Dyspnea on Exertion] : no dyspnea on exertion [Negative] : Psychiatric [FreeTextEntry6] : see HPi

## 2021-07-06 NOTE — ASSESSMENT
[FreeTextEntry1] : post op cough , CXRY - no acute process \par  Continue Tessalon Perles TID PRN\par  Continue incentive spirometry 4 x day \par exercise / walk  as tolerated \par F/up with Dr. Fiore  thoracic surgeon , has telehealth appt , scheduled for repeat CT chest in December \par F/up Dr. Balbuena 9/2/21 as scheduled /sooner as needed \par

## 2021-07-06 NOTE — PHYSICAL EXAM
[No Acute Distress] : no acute distress [Well Nourished] : well nourished [Well Developed] : well developed [Normal Outer Ear/Nose] : the outer ears and nose were normal in appearance [Normal Oropharynx] : the oropharynx was normal [Normal TMs] : both tympanic membranes were normal [Supple] : supple [No Respiratory Distress] : no respiratory distress  [No Accessory Muscle Use] : no accessory muscle use [Clear to Auscultation] : lungs were clear to auscultation bilaterally [Normal Rate] : normal rate  [Regular Rhythm] : with a regular rhythm [Normal S1, S2] : normal S1 and S2 [Normal Posterior Cervical Nodes] : no posterior cervical lymphadenopathy [Normal Anterior Cervical Nodes] : no anterior cervical lymphadenopathy [Normal Affect] : the affect was normal [Alert and Oriented x3] : oriented to person, place, and time [Normal Insight/Judgement] : insight and judgment were intact

## 2021-07-08 ENCOUNTER — NON-APPOINTMENT (OUTPATIENT)
Age: 61
End: 2021-07-08

## 2021-07-14 ENCOUNTER — APPOINTMENT (OUTPATIENT)
Dept: INTERNAL MEDICINE | Facility: CLINIC | Age: 61
End: 2021-07-14
Payer: COMMERCIAL

## 2021-07-14 DIAGNOSIS — Z87.39 PERSONAL HISTORY OF OTHER DISEASES OF THE MUSCULOSKELETAL SYSTEM AND CONNECTIVE TISSUE: ICD-10-CM

## 2021-07-14 PROCEDURE — 99441: CPT

## 2021-07-15 LAB — GI PCR PANEL, STOOL: NORMAL

## 2021-07-16 ENCOUNTER — NON-APPOINTMENT (OUTPATIENT)
Age: 61
End: 2021-07-16

## 2021-07-17 LAB
C DIFF TOX GENS STL QL NAA+PROBE: NORMAL
CDIFF BY PCR: NOT DETECTED

## 2021-07-19 ENCOUNTER — NON-APPOINTMENT (OUTPATIENT)
Age: 61
End: 2021-07-19

## 2021-08-17 ENCOUNTER — APPOINTMENT (OUTPATIENT)
Dept: INTERNAL MEDICINE | Facility: CLINIC | Age: 61
End: 2021-08-17
Payer: COMMERCIAL

## 2021-08-17 VITALS
WEIGHT: 113 LBS | DIASTOLIC BLOOD PRESSURE: 60 MMHG | TEMPERATURE: 98 F | BODY MASS INDEX: 17.74 KG/M2 | HEIGHT: 67 IN | OXYGEN SATURATION: 99 % | SYSTOLIC BLOOD PRESSURE: 104 MMHG | HEART RATE: 85 BPM

## 2021-08-17 DIAGNOSIS — R19.7 DIARRHEA, UNSPECIFIED: ICD-10-CM

## 2021-08-17 PROCEDURE — 99214 OFFICE O/P EST MOD 30 MIN: CPT

## 2021-08-17 NOTE — ASSESSMENT
[FreeTextEntry1] : 1.diarrhea: Recent GI studies negative. Repeat C. difficile PCR a stool sample was not adequate. Obtain H. pylori stool antigen test, ova parasites and stool culture. We'll need to follow up with GI as she may need repeat colonoscopy.\par \par 2.occipital head pain: With mild point tenderness. Patient wishes to get CT head over skull x-ray at this time.
Neck pain

## 2021-08-17 NOTE — HISTORY OF PRESENT ILLNESS
[FreeTextEntry8] : Ms. EMMANUEL TAYLOR is a 60 year F who comes in for an acute visit.\par Pt notes having loose stools every morning, brown color, non bloody, no mucous, foul smelling stool. No abdominal pain, nausea/vomiting. \par She has 1 episode per day for 2 months. She has lost weight as well. She did recently see nurse practitioner and had negative C. difficile PCR and GI stool studies but notes that her stool specimen was very small and she had taken Imodium that day. She did try do make an appointment with her GI but was unable to successfully get an appointment.\par Since April she had pain in the back of head, previously with movement, sharp pain. She has had neck PT in the past. She is now having headaches as well. She has been taking aspirin for pain relief.She denies any injury or trauma to this area.\par Pt with no known exposure to covid 19. Had negative covid swab at . \par Patient denies any cp, sob,abdominal pain, nausea, vomiting, palpitations, fever, chills, constipation.\par

## 2021-08-19 ENCOUNTER — NON-APPOINTMENT (OUTPATIENT)
Age: 61
End: 2021-08-19

## 2021-08-19 LAB
C DIFF TOX GENS STL QL NAA+PROBE: NORMAL
CDIFF BY PCR: NOT DETECTED

## 2021-08-20 ENCOUNTER — APPOINTMENT (OUTPATIENT)
Dept: INTERNAL MEDICINE | Facility: CLINIC | Age: 61
End: 2021-08-20

## 2021-08-26 ENCOUNTER — NON-APPOINTMENT (OUTPATIENT)
Age: 61
End: 2021-08-26

## 2021-08-26 LAB
BACTERIA STL CULT: NORMAL
DEPRECATED O AND P PREP STL: NORMAL
H PYLORI AG STL QL: NOT DETECTED

## 2021-09-03 ENCOUNTER — APPOINTMENT (OUTPATIENT)
Dept: INTERNAL MEDICINE | Facility: CLINIC | Age: 61
End: 2021-09-03
Payer: COMMERCIAL

## 2021-09-03 VITALS
DIASTOLIC BLOOD PRESSURE: 76 MMHG | OXYGEN SATURATION: 97 % | HEART RATE: 79 BPM | BODY MASS INDEX: 17.74 KG/M2 | WEIGHT: 113 LBS | HEIGHT: 67 IN | RESPIRATION RATE: 16 BRPM | TEMPERATURE: 98.3 F | SYSTOLIC BLOOD PRESSURE: 124 MMHG

## 2021-09-03 DIAGNOSIS — R07.89 OTHER CHEST PAIN: ICD-10-CM

## 2021-09-03 PROCEDURE — 99396 PREV VISIT EST AGE 40-64: CPT

## 2021-09-03 NOTE — PHYSICAL EXAM
[No Acute Distress] : no acute distress [Well Nourished] : well nourished [Well Developed] : well developed [Well-Appearing] : well-appearing [Normal Sclera/Conjunctiva] : normal sclera/conjunctiva [PERRL] : pupils equal round and reactive to light [Normal Outer Ear/Nose] : the outer ears and nose were normal in appearance [Normal Oropharynx] : the oropharynx was normal [No JVD] : no jugular venous distention [Supple] : supple [No Lymphadenopathy] : no lymphadenopathy [No Respiratory Distress] : no respiratory distress  [Clear to Auscultation] : lungs were clear to auscultation bilaterally [No Accessory Muscle Use] : no accessory muscle use [Normal Rate] : normal rate  [Regular Rhythm] : with a regular rhythm [Normal S1, S2] : normal S1 and S2 [No Carotid Bruits] : no carotid bruits [No Edema] : there was no peripheral edema [No Extremity Clubbing/Cyanosis] : no extremity clubbing/cyanosis [Soft] : abdomen soft [Non Tender] : non-tender [Normal Posterior Cervical Nodes] : no posterior cervical lymphadenopathy [Normal Anterior Cervical Nodes] : no anterior cervical lymphadenopathy [No CVA Tenderness] : no CVA  tenderness [No Visual Abnormalities] : no visible abnormalities [Normal Lordosis] : normal lordosis [No Scoliosis] : no scoliosis [___] : [unfilled] [Intact] : all sensory within normal limits bilaterally [No Joint Swelling] : no joint swelling [Grossly Normal Strength/Tone] : grossly normal strength/tone [No Rash] : no rash [Normal Gait] : normal gait [Coordination Grossly Intact] : coordination grossly intact [No Focal Deficits] : no focal deficits [Speech Grossly Normal] : speech grossly normal [Memory Grossly Normal] : memory grossly normal [Normal Affect] : the affect was normal [Alert and Oriented x3] : oriented to person, place, and time [Normal Insight/Judgement] : insight and judgment were intact [de-identified] : thin and anxious [de-identified] : non icteric, left strabismus. [de-identified] : mild diffuse thyromegaly  [de-identified] : 1/6 Kirt at B [de-identified] : anxious

## 2021-09-03 NOTE — HEALTH RISK ASSESSMENT
[Yes] : Yes [Monthly or less (1 pt)] : Monthly or less (1 point) [1 or 2 (0 pts)] : 1 or 2 (0 points) [Never (0 pts)] : Never (0 points) [No] : In the past 12 months have you used drugs other than those required for medical reasons? No [0] : 2) Feeling down, depressed, or hopeless: Not at all (0) [Patient reported mammogram was normal] : Patient reported mammogram was normal [] : No [MammogramDate] : 11/20

## 2021-09-03 NOTE — PLAN
[FreeTextEntry1] : 1. Continue with medication as outlined above.\par \par 2. At this time, will continue to monitor the atypical chest pain of which she is complaining in the right hemithorax. If the pain persists or worsens, will then perform imaging. At this time, her lungs sound clear.\par \par 3. The patient is due for the booster for the corona virus in October.\par \par 4. The patient has been instructed to attempt to increase her cardiovascular exercise.\par \par 5. Patient will followup with her new cardiologist for possible institution of statin medication\par \par 6. The patient will maintain supplements consuming 2 cans of boost or Ensure per day.\par \par 7. Follow up with myself in 6 months with routine fasting blood work and full pulmonary function testing.

## 2021-09-03 NOTE — HISTORY OF PRESENT ILLNESS
[FreeTextEntry1] : The patient comes in for a yearly wellness evaluation\par  [de-identified] : The patient states, overall, she is doing relatively well at this time. The patient did undergo a repeat video assisted thoracoscopic surgical procedure with a right lower lobe wedge resection in June of this year. The procedure was performed at , by Dr. Fiore. Pathology revealed a stage I invasive adenocarcinoma. She has done relatively well postoperatively. Recently, she has been complaining of discomfort near one of the incisions in her right lateral chest wall. The pain is not pleuritic in nature. It is just a discomfort that she notes having. She denies any trauma to the area. She has not really been exercising. She denies any cough, or sputum production.\par \par The patient did have persistent diarrhea that lasted for several weeks, again after the above noted surgery. She underwent multiple blood tests which were nondiagnostic. She was then seen by her gastroenterologist, and she was eventually put on cholestyramine powder. She stopped it after 3 days due to bloating. It was then restarted at half the dose and since then, she has done well. Her appetite has improved. She has recently gained approximately 4 pounds after losing a moderate amount of weight.\par \par The patient was seen by a new cardiologist, Dr. Heller. She is considering institution of a statin medication on her in the near future. The patient denies any chest pressure anteriorly at this time. There are no other acute constitutional symptoms. She now comes in for this assessment.

## 2021-09-17 ENCOUNTER — APPOINTMENT (OUTPATIENT)
Dept: INTERNAL MEDICINE | Facility: CLINIC | Age: 61
End: 2021-09-17

## 2022-03-04 ENCOUNTER — APPOINTMENT (OUTPATIENT)
Dept: INTERNAL MEDICINE | Facility: CLINIC | Age: 62
End: 2022-03-04

## 2022-03-04 DIAGNOSIS — Z20.822 CONTACT WITH AND (SUSPECTED) EXPOSURE TO COVID-19: ICD-10-CM

## 2022-03-08 LAB — SARS-COV-2 N GENE NPH QL NAA+PROBE: NOT DETECTED

## 2022-03-10 DIAGNOSIS — R82.90 UNSPECIFIED ABNORMAL FINDINGS IN URINE: ICD-10-CM

## 2022-04-01 ENCOUNTER — APPOINTMENT (OUTPATIENT)
Dept: INTERNAL MEDICINE | Facility: CLINIC | Age: 62
End: 2022-04-01
Payer: COMMERCIAL

## 2022-04-01 VITALS
BODY MASS INDEX: 18.68 KG/M2 | DIASTOLIC BLOOD PRESSURE: 69 MMHG | RESPIRATION RATE: 18 BRPM | TEMPERATURE: 98.5 F | HEART RATE: 83 BPM | HEIGHT: 67 IN | WEIGHT: 119 LBS | SYSTOLIC BLOOD PRESSURE: 107 MMHG | OXYGEN SATURATION: 96 %

## 2022-04-01 DIAGNOSIS — Z87.891 PERSONAL HISTORY OF NICOTINE DEPENDENCE: ICD-10-CM

## 2022-04-01 DIAGNOSIS — U07.1 COVID-19: ICD-10-CM

## 2022-04-01 DIAGNOSIS — M81.6 LOCALIZED OSTEOPOROSIS [LEQUESNE]: ICD-10-CM

## 2022-04-01 PROCEDURE — 94729 DIFFUSING CAPACITY: CPT

## 2022-04-01 PROCEDURE — 94060 EVALUATION OF WHEEZING: CPT

## 2022-04-01 PROCEDURE — 94727 GAS DIL/WSHOT DETER LNG VOL: CPT

## 2022-04-01 PROCEDURE — ZZZZZ: CPT

## 2022-04-01 PROCEDURE — 99214 OFFICE O/P EST MOD 30 MIN: CPT | Mod: 25

## 2022-04-01 NOTE — HISTORY OF PRESENT ILLNESS
[FreeTextEntry1] : The patient comes in today for a routine followup evaluation.\par  [de-identified] : We will at this time. She is not taking any medications. She recently started going back to the gym. She has been exercising 5 days a week. She has regained the weight that she lost, after undergoing her video-assisted thoracic right lower lobe wedge resection in June of last year. She is now back to her preoperative weight. Her appetite is good.\par \par The patient notes that she does cough once usually on a daily basis. She does produce scant amounts of sputum. It is usually the appearance of one small brown piece of sputum. She denies any obvious postnasal drip. It does not usually bother her throughout the course of the day.\par \par The patient did contract a coronavirus in January of this year. His symptoms are very mild. She has received a booster dose. She has been seen by cardiology. She is scheduled to followup with her cardiologist next week. She has not yet been started on a statin medication. She comes in for this assessment.

## 2022-04-01 NOTE — PHYSICAL EXAM
[No Acute Distress] : no acute distress [Well Nourished] : well nourished [Well Developed] : well developed [Well-Appearing] : well-appearing [Normal Outer Ear/Nose] : the outer ears and nose were normal in appearance [Normal Oropharynx] : the oropharynx was normal [No JVD] : no jugular venous distention [No Lymphadenopathy] : no lymphadenopathy [No Respiratory Distress] : no respiratory distress  [Clear to Auscultation] : lungs were clear to auscultation bilaterally [No Accessory Muscle Use] : no accessory muscle use [Normal Rate] : normal rate  [Regular Rhythm] : with a regular rhythm [Normal S1, S2] : normal S1 and S2 [No Edema] : there was no peripheral edema [No Extremity Clubbing/Cyanosis] : no extremity clubbing/cyanosis [Soft] : abdomen soft [Normal Bowel Sounds] : normal bowel sounds [Normal Posterior Cervical Nodes] : no posterior cervical lymphadenopathy [Normal Anterior Cervical Nodes] : no anterior cervical lymphadenopathy [No CVA Tenderness] : no CVA  tenderness [No Visual Abnormalities] : no visible abnormalities [Normal Lordosis] : normal lordosis [No Scoliosis] : no scoliosis [___] : [unfilled] [Intact] : all sensory within normal limits bilaterally [Speech Grossly Normal] : speech grossly normal [Memory Grossly Normal] : memory grossly normal [Normal Affect] : the affect was normal [Alert and Oriented x3] : oriented to person, place, and time [Normal Insight/Judgement] : insight and judgment were intact [de-identified] : thin  [de-identified] : There is a well-healed scar on the left posterior chest wall region [de-identified] : 1/6 Kirt at B [de-identified] : There is a questionable small herniation in the midepigastric region below the xiphoid process [de-identified] : anxious

## 2022-04-01 NOTE — DATA REVIEWED
[FreeTextEntry1] : A pulmonary function test is performed. Lung volumes are within normal limits. Lung mechanics reveal a normal FEV1. The FEF 25-75 is mildly reduced. Mild bronchodilator reactivity is demonstrated. The DLCO and saturation maintained. This represents a mild degree of obstruction with mild airway reactivity. When compared to the prior study, the flow rates have improved.

## 2022-04-01 NOTE — PLAN
[FreeTextEntry1] : 1. Continued cardiovascular exercise regimen.\par \par 2. Await cardiology recommendations next week regarding possible institution of statin medication.\par \par 3. The patient is to consider starting Prolia in the near future to treat her osteoporosis. This will be administered by a gynecologist\par \par 4. The patient is due for colonoscopy this year. She has an appointment scheduled for the preop next week\par \par 5. The patient undergo a repeat CAT scan of the chest in June of this year as part of her continued surveillance due to her history of lung cancer\par \par 6. Follow up with myself in 6 months with a wellness evaluation. I have discussed with the patient the option of undergoing another booster for the corona virus as well

## 2023-01-27 ENCOUNTER — APPOINTMENT (OUTPATIENT)
Dept: INTERNAL MEDICINE | Facility: CLINIC | Age: 63
End: 2023-01-27

## 2023-09-08 ENCOUNTER — APPOINTMENT (OUTPATIENT)
Dept: INTERNAL MEDICINE | Facility: CLINIC | Age: 63
End: 2023-09-08
Payer: COMMERCIAL

## 2023-09-08 VITALS
BODY MASS INDEX: 18 KG/M2 | DIASTOLIC BLOOD PRESSURE: 68 MMHG | HEIGHT: 67.5 IN | RESPIRATION RATE: 18 BRPM | SYSTOLIC BLOOD PRESSURE: 110 MMHG | HEART RATE: 91 BPM | OXYGEN SATURATION: 98 % | TEMPERATURE: 97.6 F | WEIGHT: 116 LBS

## 2023-09-08 DIAGNOSIS — J45.20 MILD INTERMITTENT ASTHMA, UNCOMPLICATED: ICD-10-CM

## 2023-09-08 DIAGNOSIS — M81.0 AGE-RELATED OSTEOPOROSIS W/OUT CURRENT PATHOLOGICAL FRACTURE: ICD-10-CM

## 2023-09-08 PROCEDURE — 99214 OFFICE O/P EST MOD 30 MIN: CPT | Mod: 25

## 2023-09-08 PROCEDURE — 94060 EVALUATION OF WHEEZING: CPT

## 2023-09-08 PROCEDURE — ZZZZZ: CPT

## 2023-09-08 PROCEDURE — 94729 DIFFUSING CAPACITY: CPT

## 2023-09-08 PROCEDURE — G0009: CPT

## 2023-09-08 PROCEDURE — 94727 GAS DIL/WSHOT DETER LNG VOL: CPT

## 2023-09-08 PROCEDURE — 90677 PCV20 VACCINE IM: CPT

## 2023-09-08 RX ORDER — DENOSUMAB 60 MG/ML
60 INJECTION SUBCUTANEOUS
Refills: 0 | Status: ACTIVE | COMMUNITY

## 2023-09-08 NOTE — PLAN
[FreeTextEntry1] : 1. Continue current medications as outlined above.  2. Prevnar-20 vaccine will be given in office today.   3. Undergo follow up CT of the chest in February 2024.   4. Follow up with Dr. Fiore in April 2024 regarding pulmonary nodules.  He will review the above-noted CAT scan of the chest at that time.  5. Follow up in 6 months with wellness evaluation.   6. COVID, Influenza, and RSV vaccinations are recommended in the fall.  7. Maintain exercise regimen as tolerated.  8.  The patient will continue to follow-up with her hematologist/oncologist, Dr. Coats as well.

## 2023-09-08 NOTE — DATA REVIEWED
[FreeTextEntry1] : A pulmonary function test is performed.  Lung volumes are within normal limits.  Lung mechanics reveal a mild decrease in flow rates with mild to moderate bronchodilator reactivity demonstrated.  The DLCO and saturation are maintained.  This represents a mild degree of obstruction with airway reactivity.  This is consistent with the patient's clinical diagnosis of mild intermittent asthma.  When compared to the prior study, the flow rates have diminished slightly.

## 2023-09-08 NOTE — PHYSICAL EXAM
[No Acute Distress] : no acute distress [Well Nourished] : well nourished [Well Developed] : well developed [Well-Appearing] : well-appearing [Normal Outer Ear/Nose] : the outer ears and nose were normal in appearance [Normal Oropharynx] : the oropharynx was normal [No JVD] : no jugular venous distention [No Lymphadenopathy] : no lymphadenopathy [No Respiratory Distress] : no respiratory distress  [Clear to Auscultation] : lungs were clear to auscultation bilaterally [No Accessory Muscle Use] : no accessory muscle use [Normal Rate] : normal rate  [Regular Rhythm] : with a regular rhythm [Normal S1, S2] : normal S1 and S2 [No Edema] : there was no peripheral edema [No Extremity Clubbing/Cyanosis] : no extremity clubbing/cyanosis [Soft] : abdomen soft [Normal Bowel Sounds] : normal bowel sounds [Normal Posterior Cervical Nodes] : no posterior cervical lymphadenopathy [Normal Anterior Cervical Nodes] : no anterior cervical lymphadenopathy [No CVA Tenderness] : no CVA  tenderness [No Visual Abnormalities] : no visible abnormalities [Normal Lordosis] : normal lordosis [No Scoliosis] : no scoliosis [___] : [unfilled] [Intact] : all sensory within normal limits bilaterally [Speech Grossly Normal] : speech grossly normal [Memory Grossly Normal] : memory grossly normal [Normal Affect] : the affect was normal [Alert and Oriented x3] : oriented to person, place, and time [Normal Insight/Judgement] : insight and judgment were intact [de-identified] : thin  [de-identified] : There is a well-healed scar on the left posterior chest wall region [de-identified] : 1/6 Kirt at B [de-identified] : There is a questionable small herniation in the midepigastric region below the xiphoid process [de-identified] : anxious

## 2023-09-08 NOTE — HISTORY OF PRESENT ILLNESS
[FreeTextEntry1] : The patient comes in today for a routine follow-up evaluation. [de-identified] :  The patient is feeling reasonably well at this time. He has a hx of lung cancer for which she is being followed by me and Dr. Fiore at [facility]. She has had lung cancer twice; one in each lung. She last had a CT of the chest in November 2022 which showed stable nodules in the right lung. She did a PET CT in May 2023, which demonstrated no significant abnormal FDG uptake and no evidence for recurrent or metastatic disease. She denies Haemoptysis. There has been no chest pain, shortness of breath, palpitations, or PND. There has been no cough, fevers, chills, or night sweats.   She has a hx of allergies and post-nasal drip. She is not taking any medications and reports to be stable of late.   She continues to follow with her cardiologist, Dr. Kumari. She is now taking Pravastatin for HLD. She reports that her cardiologic workups have been unremarkable.   She has a history of osteoporosis which is maintained with Prolia injections every 6 months. She is being followed by her GYN, Dr. Jo Elliott.   She is up to date with colonoscopy with the latest one being in June 2022. She is up to date on the SHINGRIX vaccine. There have been no other acute constitutional symptoms. She comes in for this assessment.

## 2023-10-23 ENCOUNTER — OFFICE (OUTPATIENT)
Facility: LOCATION | Age: 63
Setting detail: OPHTHALMOLOGY
End: 2023-10-23

## 2023-10-23 ENCOUNTER — OFFICE (OUTPATIENT)
Facility: LOCATION | Age: 63
Setting detail: OPHTHALMOLOGY
End: 2023-10-23
Payer: COMMERCIAL

## 2023-10-23 DIAGNOSIS — H01.002: ICD-10-CM

## 2023-10-23 DIAGNOSIS — H01.004: ICD-10-CM

## 2023-10-23 DIAGNOSIS — Z13.5: ICD-10-CM

## 2023-10-23 DIAGNOSIS — H16.223: ICD-10-CM

## 2023-10-23 DIAGNOSIS — H25.13: ICD-10-CM

## 2023-10-23 DIAGNOSIS — H43.393: ICD-10-CM

## 2023-10-23 DIAGNOSIS — H01.001: ICD-10-CM

## 2023-10-23 DIAGNOSIS — H01.005: ICD-10-CM

## 2023-10-23 PROBLEM — H31.29 PERIPAPILLARY ATROPHY ; BOTH EYES: Status: ACTIVE | Noted: 2023-10-23

## 2023-10-23 PROBLEM — H10.433 CONJUNCTIVITIS CHRONIC FOLLICULAR; BOTH EYES: Status: ACTIVE | Noted: 2023-10-23

## 2023-10-23 PROBLEM — H11.153 PINGUECULA; BOTH EYES: Status: ACTIVE | Noted: 2023-10-23

## 2023-10-23 PROCEDURE — 92014 COMPRE OPH EXAM EST PT 1/>: CPT | Performed by: OPHTHALMOLOGY

## 2023-10-23 PROCEDURE — OPTOS FUNDUS PHOTOGRAPHY - NO FINDINGS: Performed by: OPHTHALMOLOGY

## 2023-10-23 PROCEDURE — 92250 FUNDUS PHOTOGRAPHY W/I&R: CPT | Mod: GY | Performed by: OPHTHALMOLOGY

## 2023-10-23 ASSESSMENT — CONFRONTATIONAL VISUAL FIELD TEST (CVF)
OD_FINDINGS: FULL
OS_FINDINGS: FULL

## 2023-10-23 ASSESSMENT — SUPERFICIAL PUNCTATE KERATITIS (SPK)
OD_SPK: T
OS_SPK: T

## 2023-10-23 ASSESSMENT — LID EXAM ASSESSMENTS
OS_BLEPHARITIS: LLL LUL T
OD_BLEPHARITIS: RLL RUL T

## 2023-10-23 ASSESSMENT — TONOMETRY
OS_IOP_MMHG: 12
OD_IOP_MMHG: 12

## 2023-10-25 ASSESSMENT — REFRACTION_CURRENTRX
OS_SPHERE: +2.25
OD_VPRISM_DIRECTION: PROGS
OS_AXIS: 4
OS_VPRISM_DIRECTION: PROGS
OD_OVR_VA: 20/
OS_CYLINDER: +0.25
OD_SPHERE: +1.75
OS_ADD: +2.25
OD_AXIS: 169
OD_CYLINDER: +0.75
OS_OVR_VA: 20/
OD_ADD: +2.25

## 2023-10-25 ASSESSMENT — VISUAL ACUITY
OD_BCVA: 20/20-1
OS_BCVA: 20/25+2

## 2024-03-27 ENCOUNTER — APPOINTMENT (OUTPATIENT)
Dept: INTERNAL MEDICINE | Facility: CLINIC | Age: 64
End: 2024-03-27
Payer: COMMERCIAL

## 2024-03-27 VITALS
HEART RATE: 68 BPM | SYSTOLIC BLOOD PRESSURE: 100 MMHG | DIASTOLIC BLOOD PRESSURE: 60 MMHG | TEMPERATURE: 98.9 F | RESPIRATION RATE: 16 BRPM | WEIGHT: 119 LBS | BODY MASS INDEX: 18.68 KG/M2 | OXYGEN SATURATION: 97 % | HEIGHT: 67 IN

## 2024-03-27 DIAGNOSIS — Z00.00 ENCOUNTER FOR GENERAL ADULT MEDICAL EXAMINATION W/OUT ABNORMAL FINDINGS: ICD-10-CM

## 2024-03-27 DIAGNOSIS — C34.91 MALIGNANT NEOPLASM OF UNSPECIFIED PART OF RIGHT BRONCHUS OR LUNG: ICD-10-CM

## 2024-03-27 DIAGNOSIS — E78.5 HYPERLIPIDEMIA, UNSPECIFIED: ICD-10-CM

## 2024-03-27 DIAGNOSIS — E04.2 NONTOXIC MULTINODULAR GOITER: ICD-10-CM

## 2024-03-27 DIAGNOSIS — C34.92 MALIGNANT NEOPLASM OF UNSPECIFIED PART OF LEFT BRONCHUS OR LUNG: ICD-10-CM

## 2024-03-27 PROCEDURE — 99396 PREV VISIT EST AGE 40-64: CPT

## 2024-03-27 RX ORDER — ALPRAZOLAM 0.25 MG/1
0.25 TABLET ORAL
Qty: 60 | Refills: 0 | Status: DISCONTINUED | COMMUNITY
Start: 2021-05-13 | End: 2024-03-27

## 2024-03-27 NOTE — PHYSICAL EXAM
[No Acute Distress] : no acute distress [Well Nourished] : well nourished [Well Developed] : well developed [Well-Appearing] : well-appearing [Normal Sclera/Conjunctiva] : normal sclera/conjunctiva [PERRL] : pupils equal round and reactive to light [Normal Outer Ear/Nose] : the outer ears and nose were normal in appearance [Normal Oropharynx] : the oropharynx was normal [No JVD] : no jugular venous distention [Supple] : supple [No Lymphadenopathy] : no lymphadenopathy [No Respiratory Distress] : no respiratory distress  [Clear to Auscultation] : lungs were clear to auscultation bilaterally [No Accessory Muscle Use] : no accessory muscle use [Normal Rate] : normal rate  [Regular Rhythm] : with a regular rhythm [Normal S1, S2] : normal S1 and S2 [No Carotid Bruits] : no carotid bruits [No Edema] : there was no peripheral edema [No Extremity Clubbing/Cyanosis] : no extremity clubbing/cyanosis [Soft] : abdomen soft [Non Tender] : non-tender [Normal Posterior Cervical Nodes] : no posterior cervical lymphadenopathy [Normal Anterior Cervical Nodes] : no anterior cervical lymphadenopathy [No CVA Tenderness] : no CVA  tenderness [No Visual Abnormalities] : no visible abnormalities [Normal Lordosis] : normal lordosis [No Scoliosis] : no scoliosis [___] : [unfilled] [Intact] : all sensory within normal limits bilaterally [No Joint Swelling] : no joint swelling [Grossly Normal Strength/Tone] : grossly normal strength/tone [No Rash] : no rash [Normal Gait] : normal gait [No Focal Deficits] : no focal deficits [Normal Affect] : the affect was normal [Alert and Oriented x3] : oriented to person, place, and time [Normal Insight/Judgement] : insight and judgment were intact [Non-distended] : non-distended [No Masses] : no abdominal mass palpated [No HSM] : no HSM [Normal Bowel Sounds] : normal bowel sounds [No Spinal Tenderness] : no spinal tenderness [de-identified] : non icteric, left strabismus. [de-identified] : thin and anxious [de-identified] : 1/6 Kirt at B [de-identified] : mild diffuse thyromegaly with asymmetry [de-identified] : anxious

## 2024-03-27 NOTE — HISTORY OF PRESENT ILLNESS
[FreeTextEntry1] :  The patient comes in for a yearly wellness evaluation. [de-identified] :  The patient is feeling well at this time. The patient has a history of lung cancer for which she is being followed by me and Dr. Fiore at [facility]. She has had lung cancer twice; one in each lung. She last had a CT of the chest in February 2024 which showed stable nodules in the right lung.  Previously, she did a PET CT in May 2023, which demonstrated no significant abnormal FDG uptake and no evidence for recurrent or metastatic disease.  There has been no chest pain, shortness of breath, palpitations, or PND. There have been no cough, fevers, chills, or night sweats. Additionally, she follows with hematologist, Dr. Miramontes for continued monitoring of her lung cancer.    She also has a history of Hyperlipidemia and continues to take Pravastatin 10 MG once nocturnally. She is generally tolerating the statin agent well, denying any severe muscle aches or any other overt symptoms. She continues to be followed by her cardiologist, Dr. Kumari, to monitor her HLD.  She has a history of allergies and post-nasal drip. She is not taking any medications and reports to be stable of late.  She has a history of osteoporosis which is maintained with Prolia injections every 6 months. She is being followed by her GYN, Dr. Jo Elliott.  She is up to date with colonoscopy with the latest one being in June 2022. She is up to date on mammography. She does see a dermatologist annually for skin evaluations. The patient did receive the Flu vaccine in October 2023. She is up to date on all other vaccines. There have been no other acute constitutional symptoms. She comes in for this assessment.

## 2024-03-27 NOTE — ADDENDUM
[FreeTextEntry1] : I, Molly Valdez, am scribing for and in the presence of Dr. Balbuena in the following sections HISTORY OF PRESENT ILLNESS; REVIEW OF SYSTEMS; VITAL SIGNS; ASSESSMENT/PLAN.

## 2024-03-27 NOTE — PLAN
[FreeTextEntry1] :  1. Continue current medications as outlined above.  2. The patient will undergo a repeat thyroid sonogram to reevaluate sub centimeter nodules she had in the past.   3. The patient will undergo routine fasting bloodwork in the near future.   4. Follow up in 6 months with PFT.   5. The patient has an appointment with her cardiologist, Dr. Kumari in May 2024 to monitor her HLD.   6. Continue to follow with Dr. Fiore at [facility] for continued monitoring of lung cancer.   7. Continue to follow with hematologist, Dr. Miramontes, for monitoring of lung cancer.   8. Continue to follow with GYN Dr. Jo Elliott, for continued monitoring of osteoporosis.   9. Continue to follow with dermatology for annual skin evaluations.   10. Cardiovascular exercise as tolerated.

## 2024-05-02 RX ORDER — PRAVASTATIN SODIUM 20 MG/1
20 TABLET ORAL
Refills: 0 | Status: ACTIVE | COMMUNITY
Start: 2022-10-19

## 2024-07-05 ENCOUNTER — APPOINTMENT (OUTPATIENT)
Dept: OBGYN | Facility: CLINIC | Age: 64
End: 2024-07-05
Payer: COMMERCIAL

## 2024-07-05 ENCOUNTER — LABORATORY RESULT (OUTPATIENT)
Age: 64
End: 2024-07-05

## 2024-07-05 VITALS
WEIGHT: 119 LBS | SYSTOLIC BLOOD PRESSURE: 107 MMHG | BODY MASS INDEX: 18.68 KG/M2 | DIASTOLIC BLOOD PRESSURE: 67 MMHG | HEIGHT: 67 IN

## 2024-07-05 DIAGNOSIS — R87.619 UNSPECIFIED ABNORMAL CYTOLOGICAL FINDINGS IN SPECIMENS FROM CERVIX UTERI: ICD-10-CM

## 2024-07-05 PROCEDURE — 99212 OFFICE O/P EST SF 10 MIN: CPT

## 2024-07-05 PROCEDURE — 99202 OFFICE O/P NEW SF 15 MIN: CPT

## 2024-07-08 LAB — HPV HIGH+LOW RISK DNA PNL CVX: NOT DETECTED

## 2024-08-16 NOTE — PLAN
Writer called patient for follow up on symptoms / rx request. Left detailed message encouraging ER disposition if patient is still experiencing congestion and cough. Also relayed that virtual appointments are available if patient wishes to be seen by a provider. Left callback number, [phone].    EVON Oshea      [FreeTextEntry1] : 1. Continue with medication as outlined above.\par \par 2. TDaP has been updated.\par \par 3. The patient will obtain the new shingles vaccine when it becomes available.\par \par 4. The patient has dropped off the most recent at [facility], for review by her radiation oncologist, Dr. Covarrubias. We will await his recommendations. If the lesion has enlarged, and he feels comfortable, and a transthoracic needle biopsy will be performed with placement of a fiducial. If a biopsy is performed, then the patient would consider CyberKnife therapy. If the biopsy is not performed, due to the fact that there is no change in the size of the lesion, we would then consider a 6 month followup study to reevaluate the groundglass opacity.\par \par 5. The patient will followup with her cardiologist regarding the performance of a coronary calcium score due to her strong family history of premature coronary artery disease.\par \par 6. Follow up with myself in 6 months for full pulmonary function testing.

## 2024-10-15 ENCOUNTER — APPOINTMENT (OUTPATIENT)
Dept: INTERNAL MEDICINE | Facility: CLINIC | Age: 64
End: 2024-10-15
Payer: COMMERCIAL

## 2024-10-15 VITALS
DIASTOLIC BLOOD PRESSURE: 51 MMHG | HEIGHT: 67 IN | OXYGEN SATURATION: 98 % | SYSTOLIC BLOOD PRESSURE: 107 MMHG | WEIGHT: 115 LBS | HEART RATE: 78 BPM | BODY MASS INDEX: 18.05 KG/M2 | TEMPERATURE: 98.1 F | RESPIRATION RATE: 16 BRPM

## 2024-10-15 DIAGNOSIS — Z00.00 ENCOUNTER FOR GENERAL ADULT MEDICAL EXAMINATION W/OUT ABNORMAL FINDINGS: ICD-10-CM

## 2024-10-15 DIAGNOSIS — J45.20 MILD INTERMITTENT ASTHMA, UNCOMPLICATED: ICD-10-CM

## 2024-10-15 DIAGNOSIS — C34.91 MALIGNANT NEOPLASM OF UNSPECIFIED PART OF RIGHT BRONCHUS OR LUNG: ICD-10-CM

## 2024-10-15 DIAGNOSIS — E04.2 NONTOXIC MULTINODULAR GOITER: ICD-10-CM

## 2024-10-15 DIAGNOSIS — E78.5 HYPERLIPIDEMIA, UNSPECIFIED: ICD-10-CM

## 2024-10-15 DIAGNOSIS — C34.92 MALIGNANT NEOPLASM OF UNSPECIFIED PART OF LEFT BRONCHUS OR LUNG: ICD-10-CM

## 2024-10-15 DIAGNOSIS — M81.0 AGE-RELATED OSTEOPOROSIS W/OUT CURRENT PATHOLOGICAL FRACTURE: ICD-10-CM

## 2024-10-15 PROCEDURE — 94060 EVALUATION OF WHEEZING: CPT

## 2024-10-15 PROCEDURE — 94729 DIFFUSING CAPACITY: CPT

## 2024-10-15 PROCEDURE — 94727 GAS DIL/WSHOT DETER LNG VOL: CPT

## 2024-10-15 PROCEDURE — 99214 OFFICE O/P EST MOD 30 MIN: CPT | Mod: 25

## 2024-10-15 RX ORDER — ALPRAZOLAM 0.25 MG/1
0.25 TABLET ORAL
Qty: 10 | Refills: 0 | Status: ACTIVE | COMMUNITY
Start: 2024-10-15 | End: 1900-01-01

## 2025-01-14 ENCOUNTER — APPOINTMENT (OUTPATIENT)
Dept: OBGYN | Facility: CLINIC | Age: 65
End: 2025-01-14
Payer: COMMERCIAL

## 2025-01-14 VITALS
HEIGHT: 67 IN | WEIGHT: 119 LBS | DIASTOLIC BLOOD PRESSURE: 70 MMHG | BODY MASS INDEX: 18.68 KG/M2 | SYSTOLIC BLOOD PRESSURE: 120 MMHG

## 2025-01-14 DIAGNOSIS — Z01.419 ENCOUNTER FOR GYNECOLOGICAL EXAMINATION (GENERAL) (ROUTINE) W/OUT ABNORMAL FINDINGS: ICD-10-CM

## 2025-01-14 DIAGNOSIS — N90.7 VULVAR CYST: ICD-10-CM

## 2025-01-14 LAB
CARD LOT #: NORMAL
CARD LOT EXP DATE: NORMAL
DATE COLLECTED: NORMAL
DATE COLLECTED: NORMAL
DEVELOPER LOT #: NORMAL
DEVELOPER LOT EXP DATE: NORMAL
HEMOCCULT 2: NEGATIVE
HEMOCCULT SP1 STL QL: NEGATIVE
QUALITY CONTROL: YES
QUALITY CONTROL: YES

## 2025-01-14 PROCEDURE — 99396 PREV VISIT EST AGE 40-64: CPT

## 2025-01-14 PROCEDURE — 82270 OCCULT BLOOD FECES: CPT

## 2025-01-16 LAB — HPV HIGH+LOW RISK DNA PNL CVX: NOT DETECTED

## 2025-02-18 ENCOUNTER — APPOINTMENT (OUTPATIENT)
Dept: OBGYN | Facility: CLINIC | Age: 65
End: 2025-02-18
Payer: COMMERCIAL

## 2025-02-18 ENCOUNTER — LABORATORY RESULT (OUTPATIENT)
Age: 65
End: 2025-02-18

## 2025-02-18 VITALS — DIASTOLIC BLOOD PRESSURE: 72 MMHG | SYSTOLIC BLOOD PRESSURE: 122 MMHG

## 2025-02-18 DIAGNOSIS — R87.615 UNSATISFACTORY CYTOLOGIC SMEAR OF CERVIX: ICD-10-CM

## 2025-02-24 ENCOUNTER — NON-APPOINTMENT (OUTPATIENT)
Age: 65
End: 2025-02-24

## 2025-02-25 LAB
CYTOLOGY CVX/VAG DOC THIN PREP: ABNORMAL
HPV HIGH+LOW RISK DNA PNL CVX: DETECTED

## 2025-04-30 ENCOUNTER — APPOINTMENT (OUTPATIENT)
Dept: INTERNAL MEDICINE | Facility: CLINIC | Age: 65
End: 2025-04-30
Payer: COMMERCIAL

## 2025-04-30 ENCOUNTER — NON-APPOINTMENT (OUTPATIENT)
Age: 65
End: 2025-04-30

## 2025-04-30 VITALS
TEMPERATURE: 98 F | HEART RATE: 97 BPM | DIASTOLIC BLOOD PRESSURE: 80 MMHG | SYSTOLIC BLOOD PRESSURE: 132 MMHG | HEIGHT: 67 IN | OXYGEN SATURATION: 97 % | BODY MASS INDEX: 18.68 KG/M2 | WEIGHT: 119 LBS | RESPIRATION RATE: 16 BRPM

## 2025-04-30 DIAGNOSIS — J45.20 MILD INTERMITTENT ASTHMA, UNCOMPLICATED: ICD-10-CM

## 2025-04-30 DIAGNOSIS — Z00.00 ENCOUNTER FOR GENERAL ADULT MEDICAL EXAMINATION W/OUT ABNORMAL FINDINGS: ICD-10-CM

## 2025-04-30 DIAGNOSIS — R19.00 INTRA-ABDOMINAL AND PELVIC SWELLING, MASS AND LUMP, UNSPECIFIED SITE: ICD-10-CM

## 2025-04-30 DIAGNOSIS — Z87.891 PERSONAL HISTORY OF NICOTINE DEPENDENCE: ICD-10-CM

## 2025-04-30 PROCEDURE — 99396 PREV VISIT EST AGE 40-64: CPT

## 2025-04-30 PROCEDURE — 99213 OFFICE O/P EST LOW 20 MIN: CPT | Mod: 25

## 2025-05-05 ENCOUNTER — NON-APPOINTMENT (OUTPATIENT)
Age: 65
End: 2025-05-05

## 2025-05-05 DIAGNOSIS — R16.0 HEPATOMEGALY, NOT ELSEWHERE CLASSIFIED: ICD-10-CM

## 2025-05-20 ENCOUNTER — APPOINTMENT (OUTPATIENT)
Facility: CLINIC | Age: 65
End: 2025-05-20
Payer: OTHER MISCELLANEOUS

## 2025-05-20 DIAGNOSIS — S63.501A UNSPECIFIED SPRAIN OF RIGHT WRIST, INITIAL ENCOUNTER: ICD-10-CM

## 2025-05-20 PROCEDURE — 99204 OFFICE O/P NEW MOD 45 MIN: CPT

## 2025-06-13 ENCOUNTER — APPOINTMENT (OUTPATIENT)
Dept: ORTHOPEDIC SURGERY | Facility: CLINIC | Age: 65
End: 2025-06-13

## 2025-06-23 ENCOUNTER — APPOINTMENT (OUTPATIENT)
Dept: ORTHOPEDIC SURGERY | Facility: CLINIC | Age: 65
End: 2025-06-23
Payer: OTHER MISCELLANEOUS

## 2025-06-23 VITALS — WEIGHT: 119 LBS | HEIGHT: 67 IN | BODY MASS INDEX: 18.68 KG/M2

## 2025-06-23 PROCEDURE — 99214 OFFICE O/P EST MOD 30 MIN: CPT

## 2025-06-24 ENCOUNTER — APPOINTMENT (OUTPATIENT)
Dept: MRI IMAGING | Facility: CLINIC | Age: 65
End: 2025-06-24
Payer: OTHER MISCELLANEOUS

## 2025-06-24 PROCEDURE — 73221 MRI JOINT UPR EXTREM W/O DYE: CPT | Mod: RT

## 2025-06-30 ENCOUNTER — APPOINTMENT (OUTPATIENT)
Dept: ORTHOPEDIC SURGERY | Facility: CLINIC | Age: 65
End: 2025-06-30
Payer: OTHER MISCELLANEOUS

## 2025-06-30 VITALS — WEIGHT: 119 LBS | HEIGHT: 67 IN | BODY MASS INDEX: 18.68 KG/M2

## 2025-06-30 PROCEDURE — 99214 OFFICE O/P EST MOD 30 MIN: CPT

## 2025-07-02 ENCOUNTER — RX ONLY (RX ONLY)
Age: 65
End: 2025-07-02

## 2025-07-02 ENCOUNTER — OFFICE (OUTPATIENT)
Dept: URBAN - METROPOLITAN AREA CLINIC 12 | Facility: CLINIC | Age: 65
Setting detail: OPHTHALMOLOGY
End: 2025-07-02
Payer: COMMERCIAL

## 2025-07-02 DIAGNOSIS — H20.012: ICD-10-CM

## 2025-07-02 PROCEDURE — 99214 OFFICE O/P EST MOD 30 MIN: CPT | Performed by: STUDENT IN AN ORGANIZED HEALTH CARE EDUCATION/TRAINING PROGRAM

## 2025-07-02 ASSESSMENT — REFRACTION_AUTOREFRACTION
OD_CYLINDER: -0.75
OD_SPHERE: +3.25
OS_AXIS: 093
OS_CYLINDER: -0.50
OD_AXIS: 083
OS_SPHERE: +3.75

## 2025-07-02 ASSESSMENT — REFRACTION_CURRENTRX
OS_AXIS: 4
OD_OVR_VA: 20/
OD_ADD: +2.25
OD_VPRISM_DIRECTION: PROGS
OS_OVR_VA: 20/
OS_SPHERE: +2.25
OS_VPRISM_DIRECTION: PROGS
OD_CYLINDER: +0.75
OS_CYLINDER: +0.25
OD_AXIS: 169
OD_SPHERE: +1.75
OS_ADD: +2.25

## 2025-07-02 ASSESSMENT — VISUAL ACUITY
OS_BCVA: 20/20-2
OD_BCVA: 20/20-1

## 2025-07-02 ASSESSMENT — KERATOMETRY
OD_K1POWER_DIOPTERS: 44.75
OD_AXISANGLE_DEGREES: 161
OS_K2POWER_DIOPTERS: 45.00
OS_K1POWER_DIOPTERS: 45.00
OS_AXISANGLE_DEGREES: 090
OD_K2POWER_DIOPTERS: 45.00

## 2025-07-02 ASSESSMENT — CONFRONTATIONAL VISUAL FIELD TEST (CVF)
OD_FINDINGS: FULL
OS_FINDINGS: FULL

## 2025-07-02 ASSESSMENT — SUPERFICIAL PUNCTATE KERATITIS (SPK)
OD_SPK: T
OS_SPK: T

## 2025-07-02 ASSESSMENT — LID EXAM ASSESSMENTS
OS_BLEPHARITIS: LLL LUL T
OD_BLEPHARITIS: RLL RUL T

## 2025-07-08 ENCOUNTER — OFFICE (OUTPATIENT)
Dept: URBAN - METROPOLITAN AREA CLINIC 12 | Facility: CLINIC | Age: 65
Setting detail: OPHTHALMOLOGY
End: 2025-07-08
Payer: COMMERCIAL

## 2025-07-08 ENCOUNTER — RX ONLY (RX ONLY)
Age: 65
End: 2025-07-08

## 2025-07-08 DIAGNOSIS — H20.012: ICD-10-CM

## 2025-07-08 PROCEDURE — 99213 OFFICE O/P EST LOW 20 MIN: CPT | Performed by: STUDENT IN AN ORGANIZED HEALTH CARE EDUCATION/TRAINING PROGRAM

## 2025-07-08 ASSESSMENT — REFRACTION_AUTOREFRACTION
OD_CYLINDER: -1.00
OD_AXIS: 082
OS_CYLINDER: -0.50
OS_AXIS: 091
OS_SPHERE: +3.50
OD_SPHERE: +3.50

## 2025-07-08 ASSESSMENT — SUPERFICIAL PUNCTATE KERATITIS (SPK)
OD_SPK: T
OS_SPK: T

## 2025-07-08 ASSESSMENT — VISUAL ACUITY
OS_BCVA: 20/30-
OD_BCVA: 20/30

## 2025-07-08 ASSESSMENT — REFRACTION_CURRENTRX
OS_ADD: +2.25
OS_OVR_VA: 20/
OS_SPHERE: +2.25
OD_VPRISM_DIRECTION: PROGS
OS_AXIS: 4
OD_SPHERE: +1.75
OS_VPRISM_DIRECTION: PROGS
OD_OVR_VA: 20/
OD_ADD: +2.25
OD_AXIS: 169
OS_CYLINDER: +0.25
OD_CYLINDER: +0.75

## 2025-07-08 ASSESSMENT — KERATOMETRY
OD_AXISANGLE_DEGREES: 129
OD_K1POWER_DIOPTERS: 44.75
OS_K1POWER_DIOPTERS: 45.00
OD_K2POWER_DIOPTERS: 945.00
OS_K2POWER_DIOPTERS: 45.00
OS_AXISANGLE_DEGREES: 090

## 2025-07-08 ASSESSMENT — TONOMETRY
OD_IOP_MMHG: 14
OS_IOP_MMHG: 17

## 2025-07-08 ASSESSMENT — CONFRONTATIONAL VISUAL FIELD TEST (CVF)
OD_FINDINGS: FULL
OS_FINDINGS: FULL

## 2025-07-08 ASSESSMENT — LID EXAM ASSESSMENTS
OD_BLEPHARITIS: RLL RUL T
OS_BLEPHARITIS: LLL LUL T

## 2025-07-15 ENCOUNTER — APPOINTMENT (OUTPATIENT)
Dept: OBGYN | Facility: CLINIC | Age: 65
End: 2025-07-15
Payer: COMMERCIAL

## 2025-07-15 VITALS
HEIGHT: 67 IN | WEIGHT: 119 LBS | SYSTOLIC BLOOD PRESSURE: 102 MMHG | DIASTOLIC BLOOD PRESSURE: 60 MMHG | BODY MASS INDEX: 18.68 KG/M2

## 2025-07-15 PROBLEM — B97.7 HPV IN FEMALE: Status: ACTIVE | Noted: 2025-07-15

## 2025-07-15 PROCEDURE — 99213 OFFICE O/P EST LOW 20 MIN: CPT

## 2025-07-17 LAB
CYTOLOGY CVX/VAG DOC THIN PREP: ABNORMAL
HPV HIGH+LOW RISK DNA PNL CVX: NOT DETECTED

## 2025-07-22 DIAGNOSIS — R92.30 DENSE BREASTS, UNSPECIFIED: ICD-10-CM

## 2025-07-22 DIAGNOSIS — Z12.39 ENCOUNTER FOR OTHER SCREENING FOR MALIGNANT NEOPLASM OF BREAST: ICD-10-CM

## 2025-07-23 ENCOUNTER — OFFICE (OUTPATIENT)
Dept: URBAN - METROPOLITAN AREA CLINIC 12 | Facility: CLINIC | Age: 65
Setting detail: OPHTHALMOLOGY
End: 2025-07-23
Payer: COMMERCIAL

## 2025-07-23 DIAGNOSIS — H20.012: ICD-10-CM

## 2025-07-23 PROBLEM — H01.004 BLEPHARITIS; RIGHT UPPER LID, LEFT UPPER LID: Status: ACTIVE | Noted: 2025-07-02

## 2025-07-23 PROBLEM — H01.001 BLEPHARITIS; RIGHT UPPER LID, LEFT UPPER LID: Status: ACTIVE | Noted: 2025-07-02

## 2025-07-23 PROBLEM — H25.13 CATARACT SENILE NUCLEAR SCLEROSIS; BOTH EYES: Status: ACTIVE | Noted: 2025-07-02

## 2025-07-23 PROCEDURE — 92012 INTRM OPH EXAM EST PATIENT: CPT | Performed by: STUDENT IN AN ORGANIZED HEALTH CARE EDUCATION/TRAINING PROGRAM

## 2025-07-23 ASSESSMENT — TONOMETRY
OD_IOP_MMHG: 12
OS_IOP_MMHG: 15

## 2025-07-23 ASSESSMENT — LID EXAM ASSESSMENTS
OS_BLEPHARITIS: LLL LUL T
OD_BLEPHARITIS: RLL RUL T

## 2025-07-23 ASSESSMENT — SUPERFICIAL PUNCTATE KERATITIS (SPK)
OD_SPK: T
OS_SPK: T

## 2025-07-23 ASSESSMENT — CONFRONTATIONAL VISUAL FIELD TEST (CVF)
OD_FINDINGS: FULL
OS_FINDINGS: FULL

## 2025-07-24 ASSESSMENT — REFRACTION_AUTOREFRACTION
OD_AXIS: 086
OD_CYLINDER: -0.75
OD_SPHERE: +3.25
OS_CYLINDER: -0.50
OS_SPHERE: +3.25
OS_AXIS: 092

## 2025-07-24 ASSESSMENT — KERATOMETRY
OD_K1POWER_DIOPTERS: 44.75
OS_K2POWER_DIOPTERS: 45.25
OD_AXISANGLE_DEGREES: 135
OS_K1POWER_DIOPTERS: 44.75
OD_K2POWER_DIOPTERS: 45.25
OS_AXISANGLE_DEGREES: 171

## 2025-07-24 ASSESSMENT — REFRACTION_CURRENTRX
OD_SPHERE: +1.75
OD_CYLINDER: +0.75
OS_VPRISM_DIRECTION: PROGS
OS_SPHERE: +2.25
OS_OVR_VA: 20/
OS_CYLINDER: +0.25
OD_ADD: +2.25
OD_AXIS: 169
OS_AXIS: 4
OD_VPRISM_DIRECTION: PROGS
OD_OVR_VA: 20/
OS_ADD: +2.25

## 2025-07-24 ASSESSMENT — VISUAL ACUITY
OD_BCVA: 20/40-
OS_BCVA: 20/35-2

## 2025-08-12 ENCOUNTER — APPOINTMENT (OUTPATIENT)
Dept: INTERNAL MEDICINE | Facility: CLINIC | Age: 65
End: 2025-08-12
Payer: COMMERCIAL

## 2025-08-12 VITALS
OXYGEN SATURATION: 98 % | HEART RATE: 70 BPM | DIASTOLIC BLOOD PRESSURE: 86 MMHG | BODY MASS INDEX: 17.81 KG/M2 | HEIGHT: 67 IN | TEMPERATURE: 98.5 F | WEIGHT: 113.44 LBS | SYSTOLIC BLOOD PRESSURE: 138 MMHG

## 2025-08-12 VITALS — DIASTOLIC BLOOD PRESSURE: 90 MMHG | SYSTOLIC BLOOD PRESSURE: 140 MMHG

## 2025-08-12 DIAGNOSIS — R51.9 HEADACHE, UNSPECIFIED: ICD-10-CM

## 2025-08-12 DIAGNOSIS — U07.1 COVID-19: ICD-10-CM

## 2025-08-12 DIAGNOSIS — C34.92 MALIGNANT NEOPLASM OF UNSPECIFIED PART OF LEFT BRONCHUS OR LUNG: ICD-10-CM

## 2025-08-12 PROCEDURE — 99215 OFFICE O/P EST HI 40 MIN: CPT

## 2025-08-29 ENCOUNTER — NON-APPOINTMENT (OUTPATIENT)
Age: 65
End: 2025-08-29